# Patient Record
Sex: MALE | Race: WHITE | NOT HISPANIC OR LATINO | Employment: FULL TIME | URBAN - METROPOLITAN AREA
[De-identification: names, ages, dates, MRNs, and addresses within clinical notes are randomized per-mention and may not be internally consistent; named-entity substitution may affect disease eponyms.]

---

## 2023-05-23 ENCOUNTER — OFFICE VISIT (OUTPATIENT)
Dept: PRIMARY CARE | Facility: CLINIC | Age: 28
End: 2023-05-23
Payer: COMMERCIAL

## 2023-05-23 ENCOUNTER — LAB (OUTPATIENT)
Dept: LAB | Facility: LAB | Age: 28
End: 2023-05-23
Payer: COMMERCIAL

## 2023-05-23 VITALS
HEIGHT: 72 IN | BODY MASS INDEX: 27.43 KG/M2 | SYSTOLIC BLOOD PRESSURE: 115 MMHG | OXYGEN SATURATION: 97 % | HEART RATE: 90 BPM | WEIGHT: 202.5 LBS | DIASTOLIC BLOOD PRESSURE: 77 MMHG

## 2023-05-23 DIAGNOSIS — M77.11 LATERAL EPICONDYLITIS OF RIGHT ELBOW: Primary | ICD-10-CM

## 2023-05-23 DIAGNOSIS — N52.9 ERECTILE DYSFUNCTION, UNSPECIFIED ERECTILE DYSFUNCTION TYPE: ICD-10-CM

## 2023-05-23 PROBLEM — E78.2 MIXED DYSLIPIDEMIA: Status: ACTIVE | Noted: 2023-05-23

## 2023-05-23 PROBLEM — Q55.29 APPENDIX TESTES: Status: ACTIVE | Noted: 2023-05-23

## 2023-05-23 PROBLEM — R20.2 PARESTHESIA OF UPPER EXTREMITY: Status: ACTIVE | Noted: 2023-05-23

## 2023-05-23 PROBLEM — H69.93 DYSFUNCTION OF BOTH EUSTACHIAN TUBES: Status: ACTIVE | Noted: 2023-05-23

## 2023-05-23 PROBLEM — I10 BENIGN LABILE HYPERTENSION: Status: ACTIVE | Noted: 2023-05-23

## 2023-05-23 PROBLEM — R16.1 SPLENOMEGALY: Status: ACTIVE | Noted: 2023-05-23

## 2023-05-23 PROBLEM — U07.1 COVID-19: Status: ACTIVE | Noted: 2023-05-23

## 2023-05-23 PROBLEM — M26.629 TEMPOROMANDIBULAR JOINT PAIN DYSFUNCTION SYNDROME: Status: ACTIVE | Noted: 2023-05-23

## 2023-05-23 PROBLEM — K21.9 GERD (GASTROESOPHAGEAL REFLUX DISEASE): Status: ACTIVE | Noted: 2023-05-23

## 2023-05-23 PROCEDURE — 99213 OFFICE O/P EST LOW 20 MIN: CPT | Performed by: NURSE PRACTITIONER

## 2023-05-23 PROCEDURE — 1036F TOBACCO NON-USER: CPT | Performed by: NURSE PRACTITIONER

## 2023-05-23 PROCEDURE — 3078F DIAST BP <80 MM HG: CPT | Performed by: NURSE PRACTITIONER

## 2023-05-23 PROCEDURE — 83036 HEMOGLOBIN GLYCOSYLATED A1C: CPT

## 2023-05-23 PROCEDURE — 80061 LIPID PANEL: CPT

## 2023-05-23 PROCEDURE — 84403 ASSAY OF TOTAL TESTOSTERONE: CPT

## 2023-05-23 PROCEDURE — 85025 COMPLETE CBC W/AUTO DIFF WBC: CPT

## 2023-05-23 PROCEDURE — 36415 COLL VENOUS BLD VENIPUNCTURE: CPT

## 2023-05-23 PROCEDURE — 3074F SYST BP LT 130 MM HG: CPT | Performed by: NURSE PRACTITIONER

## 2023-05-23 PROCEDURE — 84443 ASSAY THYROID STIM HORMONE: CPT

## 2023-05-23 PROCEDURE — 80053 COMPREHEN METABOLIC PANEL: CPT

## 2023-05-23 RX ORDER — METHYLPREDNISOLONE 4 MG/1
TABLET ORAL
Qty: 21 TABLET | Refills: 0 | Status: SHIPPED | OUTPATIENT
Start: 2023-05-23 | End: 2023-05-30

## 2023-05-23 ASSESSMENT — ENCOUNTER SYMPTOMS
VOMITING: 0
CHILLS: 0
COUGH: 0
WHEEZING: 0
PALPITATIONS: 0
NAUSEA: 0
SORE THROAT: 0
ABDOMINAL PAIN: 0
SHORTNESS OF BREATH: 0
DIARRHEA: 0
FEVER: 0

## 2023-05-23 NOTE — PATIENT INSTRUCTIONS
Obtain blood work as ordered -- we will call you with the results  Medrol dose pack sent to pharmacy  Make sure to drink plenty of fluids  Follow up for complete physical exam

## 2023-05-23 NOTE — PROGRESS NOTES
Subjective   Chief Complaint: Tennis elbow (Right arm ).    HPI   Tien Salcedo is a 28 y.o. male who presents for Tennis elbow (Right arm ).    Patient presents with right elbow pain that started last week. He reports having the same pain in the past. This started 1 week ago when he started lifting weights in the gym. Had received steroid injections in the past but is not interested in doing any more injections.     He reports a sharp pain when lifting weights.     Patient also reports issues with erectile dysfunction over the past 6 months. He reports ability to obtain erection but unable to sustain.   He denies pain. He reports doing a fasting diet and sometimes goes 24 hours without eating. He has been doing this diet for 1 year.   We discussed the potential causes of his ED. Encouraged patient to avoid fasting diets. And will have blood work done. Recommend urology referral patient refuses at this time.     Review of Systems   Constitutional:  Negative for chills and fever.   HENT:  Negative for congestion and sore throat.    Respiratory:  Negative for cough, shortness of breath and wheezing.    Cardiovascular:  Negative for chest pain and palpitations.   Gastrointestinal:  Negative for abdominal pain, diarrhea, nausea and vomiting.   Genitourinary:  Negative for penile discharge, penile pain, penile swelling, scrotal swelling and testicular pain.   Musculoskeletal:         Right elbow pain        Objective   /77   Pulse 90   Ht 1.829 m (6')   Wt 91.9 kg (202 lb 8 oz)   SpO2 97%   BMI 27.46 kg/m²   BSA Body surface area is 2.16 meters squared.      Physical Exam  Constitutional:       Appearance: Normal appearance.   Cardiovascular:      Rate and Rhythm: Normal rate and regular rhythm.   Pulmonary:      Effort: Pulmonary effort is normal.      Breath sounds: Normal breath sounds.   Abdominal:      General: Abdomen is flat.      Palpations: Abdomen is soft.   Musculoskeletal:      Comments: ESTEPHANIA  elbow lateral epicondyle tender  Full ROM    Neurological:      Mental Status: He is alert.       [facility] Encounter on 10/11/2022   Component Date Value Ref Range Status    Glucose 10/11/2022 90  74 - 99 mg/dL Final    Sodium 10/11/2022 139  136 - 145 mmol/L Final    Potassium 10/11/2022 4.2  3.5 - 5.3 mmol/L Final    Chloride 10/11/2022 99  98 - 107 mmol/L Final    Bicarbonate 10/11/2022 32  21 - 32 mmol/L Final    Anion Gap 10/11/2022 12  10 - 20 mmol/L Final    Urea Nitrogen 10/11/2022 26 (H)  6 - 23 mg/dL Final    Creatinine 10/11/2022 1.32 (H)  0.50 - 1.30 mg/dL Final    GFR MALE 10/11/2022 75  >90 mL/min/1.73m2 Final    Comment:  CALCULATIONS OF ESTIMATED GFR ARE PERFORMED   USING THE 2021 CKD-EPI STUDY REFIT EQUATION   WITHOUT THE RACE VARIABLE FOR THE IDMS-TRACEABLE   CREATININE METHODS.    https://jasn.asnjournals.org/content/early/2021/09/22/ASN.7006680010      Calcium 10/11/2022 9.7  8.6 - 10.3 mg/dL Final    Albumin 10/11/2022 4.7  3.4 - 5.0 g/dL Final    Alkaline Phosphatase 10/11/2022 57  33 - 120 U/L Final    Total Protein 10/11/2022 7.4  6.4 - 8.2 g/dL Final    AST 10/11/2022 17  9 - 39 U/L Final    Total Bilirubin 10/11/2022 1.0  0.0 - 1.2 mg/dL Final    ALT (SGPT) 10/11/2022 30  10 - 52 U/L Final    Comment:  Patients treated with Sulfasalazine may generate    falsely decreased results for ALT.       No current outpatient medications on file prior to visit.     No current facility-administered medications on file prior to visit.     No images are attached to the encounter.            Assessment/Plan   Problem List Items Addressed This Visit          Genitourinary    Erectile dysfunction     BW ordered   Recommend urology referral -- if BW is normal          Relevant Orders    CBC and Auto Differential    Comprehensive Metabolic Panel    TSH with reflex to Free T4 if abnormal    Lipid Panel    Testosterone       Musculoskeletal    Lateral epicondylitis of right elbow - Primary     Methylprednisolone  ordered           Relevant Medications    methylPREDNISolone (Medrol Dospak) 4 mg tablets

## 2023-05-24 LAB
ALANINE AMINOTRANSFERASE (SGPT) (U/L) IN SER/PLAS: 24 U/L (ref 10–52)
ALBUMIN (G/DL) IN SER/PLAS: 4.8 G/DL (ref 3.4–5)
ALKALINE PHOSPHATASE (U/L) IN SER/PLAS: 55 U/L (ref 33–120)
ANION GAP IN SER/PLAS: 14 MMOL/L (ref 10–20)
ASPARTATE AMINOTRANSFERASE (SGOT) (U/L) IN SER/PLAS: 19 U/L (ref 9–39)
BASOPHILS (10*3/UL) IN BLOOD BY AUTOMATED COUNT: 0.07 X10E9/L (ref 0–0.1)
BASOPHILS/100 LEUKOCYTES IN BLOOD BY AUTOMATED COUNT: 1 % (ref 0–2)
BILIRUBIN TOTAL (MG/DL) IN SER/PLAS: 0.6 MG/DL (ref 0–1.2)
CALCIUM (MG/DL) IN SER/PLAS: 10.7 MG/DL (ref 8.6–10.6)
CARBON DIOXIDE, TOTAL (MMOL/L) IN SER/PLAS: 32 MMOL/L (ref 21–32)
CHLORIDE (MMOL/L) IN SER/PLAS: 100 MMOL/L (ref 98–107)
CHOLESTEROL (MG/DL) IN SER/PLAS: 173 MG/DL (ref 0–199)
CHOLESTEROL IN HDL (MG/DL) IN SER/PLAS: 40.5 MG/DL
CHOLESTEROL/HDL RATIO: 4.3
CREATININE (MG/DL) IN SER/PLAS: 1.2 MG/DL (ref 0.5–1.3)
EOSINOPHILS (10*3/UL) IN BLOOD BY AUTOMATED COUNT: 0.1 X10E9/L (ref 0–0.7)
EOSINOPHILS/100 LEUKOCYTES IN BLOOD BY AUTOMATED COUNT: 1.5 % (ref 0–6)
ERYTHROCYTE DISTRIBUTION WIDTH (RATIO) BY AUTOMATED COUNT: 12.8 % (ref 11.5–14.5)
ERYTHROCYTE MEAN CORPUSCULAR HEMOGLOBIN CONCENTRATION (G/DL) BY AUTOMATED: 32.4 G/DL (ref 32–36)
ERYTHROCYTE MEAN CORPUSCULAR VOLUME (FL) BY AUTOMATED COUNT: 94 FL (ref 80–100)
ERYTHROCYTES (10*6/UL) IN BLOOD BY AUTOMATED COUNT: 5.87 X10E12/L (ref 4.5–5.9)
ESTIMATED AVERAGE GLUCOSE FOR HBA1C: 103 MG/DL
GFR MALE: 83 ML/MIN/1.73M2
GLUCOSE (MG/DL) IN SER/PLAS: 101 MG/DL (ref 74–99)
HEMATOCRIT (%) IN BLOOD BY AUTOMATED COUNT: 54.9 % (ref 41–52)
HEMOGLOBIN (G/DL) IN BLOOD: 17.8 G/DL (ref 13.5–17.5)
HEMOGLOBIN A1C/HEMOGLOBIN TOTAL IN BLOOD: 5.2 %
IMMATURE GRANULOCYTES/100 LEUKOCYTES IN BLOOD BY AUTOMATED COUNT: 0.1 % (ref 0–0.9)
LDL: 116 MG/DL (ref 0–99)
LEUKOCYTES (10*3/UL) IN BLOOD BY AUTOMATED COUNT: 6.9 X10E9/L (ref 4.4–11.3)
LYMPHOCYTES (10*3/UL) IN BLOOD BY AUTOMATED COUNT: 1.5 X10E9/L (ref 1.2–4.8)
LYMPHOCYTES/100 LEUKOCYTES IN BLOOD BY AUTOMATED COUNT: 21.8 % (ref 13–44)
MONOCYTES (10*3/UL) IN BLOOD BY AUTOMATED COUNT: 0.67 X10E9/L (ref 0.1–1)
MONOCYTES/100 LEUKOCYTES IN BLOOD BY AUTOMATED COUNT: 9.7 % (ref 2–10)
NEUTROPHILS (10*3/UL) IN BLOOD BY AUTOMATED COUNT: 4.54 X10E9/L (ref 1.2–7.7)
NEUTROPHILS/100 LEUKOCYTES IN BLOOD BY AUTOMATED COUNT: 65.9 % (ref 40–80)
NRBC (PER 100 WBCS) BY AUTOMATED COUNT: 0 /100 WBC (ref 0–0)
PLATELETS (10*3/UL) IN BLOOD AUTOMATED COUNT: 174 X10E9/L (ref 150–450)
POTASSIUM (MMOL/L) IN SER/PLAS: 4.8 MMOL/L (ref 3.5–5.3)
PROTEIN TOTAL: 7.8 G/DL (ref 6.4–8.2)
SODIUM (MMOL/L) IN SER/PLAS: 141 MMOL/L (ref 136–145)
TESTOSTERONE (NG/DL) IN SER/PLAS: 425 NG/DL (ref 240–1000)
THYROTROPIN (MIU/L) IN SER/PLAS BY DETECTION LIMIT <= 0.05 MIU/L: 1.24 MIU/L (ref 0.44–3.98)
TRIGLYCERIDE (MG/DL) IN SER/PLAS: 85 MG/DL (ref 0–149)
UREA NITROGEN (MG/DL) IN SER/PLAS: 18 MG/DL (ref 6–23)
VLDL: 17 MG/DL (ref 0–40)

## 2023-05-24 RX ORDER — SILDENAFIL 50 MG/1
50 TABLET, FILM COATED ORAL DAILY PRN
Qty: 12 TABLET | Refills: 0 | Status: SHIPPED | OUTPATIENT
Start: 2023-05-24

## 2023-06-06 ENCOUNTER — LAB (OUTPATIENT)
Dept: LAB | Facility: LAB | Age: 28
End: 2023-06-06
Payer: COMMERCIAL

## 2023-06-06 ENCOUNTER — OFFICE VISIT (OUTPATIENT)
Dept: PRIMARY CARE | Facility: CLINIC | Age: 28
End: 2023-06-06
Payer: COMMERCIAL

## 2023-06-06 VITALS
TEMPERATURE: 97.5 F | HEART RATE: 108 BPM | SYSTOLIC BLOOD PRESSURE: 128 MMHG | HEIGHT: 72 IN | WEIGHT: 204 LBS | OXYGEN SATURATION: 97 % | BODY MASS INDEX: 27.63 KG/M2 | DIASTOLIC BLOOD PRESSURE: 84 MMHG

## 2023-06-06 DIAGNOSIS — R53.83 OTHER FATIGUE: ICD-10-CM

## 2023-06-06 DIAGNOSIS — J02.9 PHARYNGITIS, UNSPECIFIED ETIOLOGY: ICD-10-CM

## 2023-06-06 DIAGNOSIS — J02.9 PHARYNGITIS, UNSPECIFIED ETIOLOGY: Primary | ICD-10-CM

## 2023-06-06 LAB — POC RAPID STREP: NEGATIVE

## 2023-06-06 PROCEDURE — 86308 HETEROPHILE ANTIBODY SCREEN: CPT

## 2023-06-06 PROCEDURE — 99213 OFFICE O/P EST LOW 20 MIN: CPT | Performed by: STUDENT IN AN ORGANIZED HEALTH CARE EDUCATION/TRAINING PROGRAM

## 2023-06-06 PROCEDURE — 3079F DIAST BP 80-89 MM HG: CPT | Performed by: STUDENT IN AN ORGANIZED HEALTH CARE EDUCATION/TRAINING PROGRAM

## 2023-06-06 PROCEDURE — 87880 STREP A ASSAY W/OPTIC: CPT | Performed by: STUDENT IN AN ORGANIZED HEALTH CARE EDUCATION/TRAINING PROGRAM

## 2023-06-06 PROCEDURE — 1036F TOBACCO NON-USER: CPT | Performed by: STUDENT IN AN ORGANIZED HEALTH CARE EDUCATION/TRAINING PROGRAM

## 2023-06-06 PROCEDURE — 87081 CULTURE SCREEN ONLY: CPT

## 2023-06-06 PROCEDURE — 36415 COLL VENOUS BLD VENIPUNCTURE: CPT

## 2023-06-06 PROCEDURE — 3074F SYST BP LT 130 MM HG: CPT | Performed by: STUDENT IN AN ORGANIZED HEALTH CARE EDUCATION/TRAINING PROGRAM

## 2023-06-06 RX ORDER — AMOXICILLIN AND CLAVULANATE POTASSIUM 875; 125 MG/1; MG/1
875 TABLET, FILM COATED ORAL 2 TIMES DAILY
Qty: 20 TABLET | Refills: 0 | Status: SHIPPED | OUTPATIENT
Start: 2023-06-06 | End: 2023-06-16

## 2023-06-06 ASSESSMENT — ENCOUNTER SYMPTOMS
SORE THROAT: 1
RHINORRHEA: 0
CHILLS: 0
COUGH: 0
VOMITING: 0
ABDOMINAL PAIN: 0
DYSURIA: 0
SINUS PAIN: 0
PALPITATIONS: 0
SINUS PRESSURE: 0
SHORTNESS OF BREATH: 0
NAUSEA: 0
HEADACHES: 0
LIGHT-HEADEDNESS: 0
MYALGIAS: 0
CONSTIPATION: 0
DIARRHEA: 0
FATIGUE: 1
ARTHRALGIAS: 0
FREQUENCY: 0
DIZZINESS: 0
FEVER: 0

## 2023-06-06 NOTE — PROGRESS NOTES
Subjective   Patient ID: Tien Salcedo is a 28 y.o. male who presents for scratchy throat (White patches on tonsils X 2 days ).    HPI     4-5 days of sore throat.  He has been more tired in general in the evenings.  He had some white patches last night but he did not last night.  Throat was scratchy.  He does have a new girlfriend and sexual partner.    Review of Systems   Constitutional:  Positive for fatigue. Negative for chills and fever.   HENT:  Positive for sore throat. Negative for congestion, postnasal drip, rhinorrhea, sinus pressure and sinus pain.    Respiratory:  Negative for cough and shortness of breath.    Cardiovascular:  Negative for chest pain and palpitations.   Gastrointestinal:  Negative for abdominal pain, constipation, diarrhea, nausea and vomiting.   Genitourinary:  Negative for dysuria, frequency, penile pain and penile swelling.   Musculoskeletal:  Negative for arthralgias and myalgias.   Neurological:  Negative for dizziness, light-headedness and headaches.       Objective   /84   Pulse 108   Temp 36.4 °C (97.5 °F)   Ht 1.829 m (6')   Wt 92.5 kg (204 lb)   SpO2 97%   BMI 27.67 kg/m²     Physical Exam  Vitals and nursing note reviewed.   Constitutional:       General: He is not in acute distress.     Appearance: Normal appearance. He is normal weight. He is not ill-appearing or toxic-appearing.   Cardiovascular:      Rate and Rhythm: Normal rate and regular rhythm.      Heart sounds: Normal heart sounds.   Pulmonary:      Effort: Pulmonary effort is normal.      Breath sounds: Normal breath sounds.   Abdominal:      General: Abdomen is flat. Bowel sounds are normal.      Palpations: Abdomen is soft.   Neurological:      Mental Status: He is alert.         Assessment/Plan   Problem List Items Addressed This Visit    None  Visit Diagnoses       Pharyngitis, unspecified etiology    -  Primary    Relevant Medications    amoxicillin-pot clavulanate (Augmentin) 875-125 mg tablet     Other Relevant Orders    Mononucleosis Screen    Other fatigue

## 2023-06-07 LAB — MONONUCLEOSIS SCREEN: NEGATIVE

## 2023-06-09 LAB — GROUP A STREP SCREEN, CULTURE: NORMAL

## 2023-06-14 ENCOUNTER — TELEPHONE (OUTPATIENT)
Dept: PRIMARY CARE | Facility: CLINIC | Age: 28
End: 2023-06-14
Payer: COMMERCIAL

## 2023-06-14 NOTE — TELEPHONE ENCOUNTER
Pt was seen by Dr. Godfrey for a sore throat and swollen tonsils, strep and mono test were both negative; Dr. Godfrey prescribed an antibiotic (amoxicillin) just in case; pt would like to know if he should start the medication since he is still having symptoms    Please advise [phone]

## 2023-07-06 ENCOUNTER — OFFICE VISIT (OUTPATIENT)
Dept: PRIMARY CARE | Facility: CLINIC | Age: 28
End: 2023-07-06
Payer: COMMERCIAL

## 2023-07-06 VITALS
OXYGEN SATURATION: 95 % | HEIGHT: 72 IN | BODY MASS INDEX: 27.9 KG/M2 | WEIGHT: 206 LBS | SYSTOLIC BLOOD PRESSURE: 115 MMHG | TEMPERATURE: 97.8 F | DIASTOLIC BLOOD PRESSURE: 75 MMHG | HEART RATE: 87 BPM

## 2023-07-06 DIAGNOSIS — B35.3 TINEA PEDIS OF BOTH FEET: Primary | ICD-10-CM

## 2023-07-06 DIAGNOSIS — L50.9 WHEAL: ICD-10-CM

## 2023-07-06 PROCEDURE — 3074F SYST BP LT 130 MM HG: CPT | Performed by: STUDENT IN AN ORGANIZED HEALTH CARE EDUCATION/TRAINING PROGRAM

## 2023-07-06 PROCEDURE — 99213 OFFICE O/P EST LOW 20 MIN: CPT | Performed by: STUDENT IN AN ORGANIZED HEALTH CARE EDUCATION/TRAINING PROGRAM

## 2023-07-06 PROCEDURE — 3078F DIAST BP <80 MM HG: CPT | Performed by: STUDENT IN AN ORGANIZED HEALTH CARE EDUCATION/TRAINING PROGRAM

## 2023-07-06 PROCEDURE — 1036F TOBACCO NON-USER: CPT | Performed by: STUDENT IN AN ORGANIZED HEALTH CARE EDUCATION/TRAINING PROGRAM

## 2023-07-06 RX ORDER — AZITHROMYCIN 500 MG/1
TABLET, FILM COATED ORAL
COMMUNITY
Start: 2023-06-28 | End: 2023-07-06 | Stop reason: ALTCHOICE

## 2023-07-06 RX ORDER — TOLNAFTATE 1 G/100G
POWDER TOPICAL 2 TIMES DAILY
Qty: 108 G | Refills: 0 | Status: SHIPPED | OUTPATIENT
Start: 2023-07-06

## 2023-07-06 ASSESSMENT — ENCOUNTER SYMPTOMS
ABDOMINAL PAIN: 0
NAUSEA: 0
RHINORRHEA: 0
FATIGUE: 0
FEVER: 0
DIARRHEA: 0
VOMITING: 0
SHORTNESS OF BREATH: 0
CONSTIPATION: 0
CHILLS: 0

## 2023-07-06 NOTE — PROGRESS NOTES
Subjective   Patient ID: Tien Salcedo is a 28 y.o. male who presents for Mass (Lump behind right ear X 1 week).    HPI     He has noticed a lump behind the right ear.  Noticed it for the past week.  It has seemed to be about the same size. He has not noticed any other skin changes.    Patient also has bilateral athlete's foot.  He had been kayaking and thinks that is probably what precipitated at this time.  He has had the athlete's foot in the past and thinks that he is just more overly sensitive to it.  He has not tried anything on it at least at this time.  He has used tea tree oil in the past.    Review of Systems   Constitutional:  Negative for chills, fatigue and fever.   HENT:  Negative for congestion and rhinorrhea.    Respiratory:  Negative for shortness of breath.    Cardiovascular:  Negative for chest pain.   Gastrointestinal:  Negative for abdominal pain, constipation, diarrhea, nausea and vomiting.       Objective   /75   Pulse 87   Temp 36.6 °C (97.8 °F)   Ht 1.829 m (6')   Wt 93.4 kg (206 lb)   SpO2 95%   BMI 27.94 kg/m²     Physical Exam  Vitals and nursing note reviewed.   Constitutional:       General: He is not in acute distress.     Appearance: Normal appearance. He is normal weight. He is not ill-appearing or toxic-appearing.   Cardiovascular:      Rate and Rhythm: Normal rate and regular rhythm.      Heart sounds: Normal heart sounds.   Pulmonary:      Effort: Pulmonary effort is normal.      Breath sounds: Normal breath sounds.   Skin:     General: Skin is warm and dry.      Comments: Small wheal behind right ear   Neurological:      Mental Status: He is alert.         Assessment/Plan   Problem List Items Addressed This Visit    None  Visit Diagnoses       Tinea pedis of both feet    -  Primary    Relevant Medications    tolnaftate (Tinactin) 1 % powder    Wheal              History and physical examination as above.  Prescribed tolnaftate powder for bilateral athlete's foot.   Discussed other conservative measures to help with treatment and keeping feet dry and clean.  Small wheal present behind the right ear.  No overlying skin changes or other symptoms.  No concerning findings.  No present lymph nodes.  No other symptoms.  Discussed with patient to keep an eye on the area and if it starts changing we can take another look in the office.  He is understanding and in agreement with this plan.

## 2023-08-01 ENCOUNTER — OFFICE VISIT (OUTPATIENT)
Dept: PRIMARY CARE | Facility: CLINIC | Age: 28
End: 2023-08-01
Payer: COMMERCIAL

## 2023-08-01 VITALS
DIASTOLIC BLOOD PRESSURE: 87 MMHG | TEMPERATURE: 97.8 F | SYSTOLIC BLOOD PRESSURE: 120 MMHG | OXYGEN SATURATION: 94 % | BODY MASS INDEX: 28.04 KG/M2 | HEART RATE: 102 BPM | HEIGHT: 72 IN | WEIGHT: 207 LBS

## 2023-08-01 DIAGNOSIS — S16.1XXA STRAIN OF NECK MUSCLE, INITIAL ENCOUNTER: ICD-10-CM

## 2023-08-01 DIAGNOSIS — M79.10 MUSCLE SORENESS: ICD-10-CM

## 2023-08-01 DIAGNOSIS — M62.838 MUSCLE SPASM: ICD-10-CM

## 2023-08-01 DIAGNOSIS — M53.3 ACUTE COCCYGEAL PAIN: ICD-10-CM

## 2023-08-01 DIAGNOSIS — M25.572 ACUTE LEFT ANKLE PAIN: ICD-10-CM

## 2023-08-01 DIAGNOSIS — S39.012A STRAIN OF LUMBAR REGION, INITIAL ENCOUNTER: ICD-10-CM
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
## 2023-08-01 DIAGNOSIS — M54.50 ACUTE LEFT-SIDED LOW BACK PAIN WITHOUT SCIATICA: ICD-10-CM

## 2023-08-01 DIAGNOSIS — S29.019A THORACIC MYOFASCIAL STRAIN, INITIAL ENCOUNTER: ICD-10-CM

## 2023-08-01 PROCEDURE — 3074F SYST BP LT 130 MM HG: CPT | Performed by: STUDENT IN AN ORGANIZED HEALTH CARE EDUCATION/TRAINING PROGRAM

## 2023-08-01 PROCEDURE — 1036F TOBACCO NON-USER: CPT | Performed by: STUDENT IN AN ORGANIZED HEALTH CARE EDUCATION/TRAINING PROGRAM

## 2023-08-01 PROCEDURE — 3079F DIAST BP 80-89 MM HG: CPT | Performed by: STUDENT IN AN ORGANIZED HEALTH CARE EDUCATION/TRAINING PROGRAM

## 2023-08-01 PROCEDURE — 99214 OFFICE O/P EST MOD 30 MIN: CPT | Performed by: STUDENT IN AN ORGANIZED HEALTH CARE EDUCATION/TRAINING PROGRAM

## 2023-08-01 RX ORDER — CYCLOBENZAPRINE HCL 5 MG
5 TABLET ORAL NIGHTLY PRN
Qty: 14 TABLET | Refills: 0 | Status: SHIPPED | OUTPATIENT
Start: 2023-08-01 | End: 2023-09-30

## 2023-08-01 RX ORDER — METHYLPREDNISOLONE 4 MG/1
TABLET ORAL
Qty: 21 TABLET | Refills: 0 | Status: SHIPPED | OUTPATIENT
Start: 2023-08-01 | End: 2023-08-08

## 2023-08-01 ASSESSMENT — ENCOUNTER SYMPTOMS
HEADACHES: 1
FREQUENCY: 0
MYALGIAS: 1
ABDOMINAL PAIN: 0
FEVER: 0
PHOTOPHOBIA: 1
FLANK PAIN: 0
LIGHT-HEADEDNESS: 0
COUGH: 0
FATIGUE: 0
DIZZINESS: 0
SHORTNESS OF BREATH: 0
ARTHRALGIAS: 0
CHILLS: 0
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
## 2023-08-01 NOTE — LETTER
August 1, 2023     Patient: Tien Salcedo   YOB: 1995   Date of Visit: 8/1/2023       To Whom It May Concern:    Tien Salcedo was seen in my clinic on 8/1/2023 at 10:40 am. Please excuse Tien for his absence from work on this day to make the appointment.    He may return to work on 8/3/23.    If you have any questions or concerns, please don't hesitate to call.         Sincerely,         Noé Godfrey,         CC: No Recipients

## 2023-08-04 ENCOUNTER — OFFICE VISIT (OUTPATIENT)
Dept: PRIMARY CARE | Facility: CLINIC | Age: 28
End: 2023-08-04
Payer: COMMERCIAL

## 2023-08-04 VITALS
SYSTOLIC BLOOD PRESSURE: 111 MMHG | HEART RATE: 92 BPM | DIASTOLIC BLOOD PRESSURE: 75 MMHG | HEIGHT: 72 IN | OXYGEN SATURATION: 95 % | BODY MASS INDEX: 28.17 KG/M2 | WEIGHT: 208 LBS

## 2023-08-04 DIAGNOSIS — M99.01 SOMATIC DYSFUNCTION OF CERVICAL REGION: ICD-10-CM

## 2023-08-04 DIAGNOSIS — M79.10 MUSCLE SORENESS: ICD-10-CM

## 2023-08-04 DIAGNOSIS — M99.03 SOMATIC DYSFUNCTION OF LUMBAR REGION: ICD-10-CM

## 2023-08-04 DIAGNOSIS — M62.838 MUSCLE SPASM: ICD-10-CM

## 2023-08-04 DIAGNOSIS — M99.04 SOMATIC DYSFUNCTION OF SACRAL REGION: ICD-10-CM

## 2023-08-04 DIAGNOSIS — M99.05 SOMATIC DYSFUNCTION OF PELVIS REGION: ICD-10-CM

## 2023-08-04 DIAGNOSIS — S29.019A THORACIC MYOFASCIAL STRAIN, INITIAL ENCOUNTER: ICD-10-CM

## 2023-08-04 DIAGNOSIS — M99.00 SOMATIC DYSFUNCTION OF HEAD REGION: ICD-10-CM

## 2023-08-04 DIAGNOSIS — S16.1XXA STRAIN OF NECK MUSCLE, INITIAL ENCOUNTER: ICD-10-CM

## 2023-08-04 DIAGNOSIS — M54.50 ACUTE LEFT-SIDED LOW BACK PAIN WITHOUT SCIATICA: ICD-10-CM

## 2023-08-04 DIAGNOSIS — S39.012A STRAIN OF LUMBAR REGION, INITIAL ENCOUNTER: ICD-10-CM
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
## 2023-08-04 DIAGNOSIS — M99.02 SOMATIC DYSFUNCTION OF THORACIC REGION: ICD-10-CM

## 2023-08-04 PROCEDURE — 3074F SYST BP LT 130 MM HG: CPT | Performed by: STUDENT IN AN ORGANIZED HEALTH CARE EDUCATION/TRAINING PROGRAM

## 2023-08-04 PROCEDURE — 1036F TOBACCO NON-USER: CPT | Performed by: STUDENT IN AN ORGANIZED HEALTH CARE EDUCATION/TRAINING PROGRAM

## 2023-08-04 PROCEDURE — 3078F DIAST BP <80 MM HG: CPT | Performed by: STUDENT IN AN ORGANIZED HEALTH CARE EDUCATION/TRAINING PROGRAM

## 2023-08-04 PROCEDURE — 99213 OFFICE O/P EST LOW 20 MIN: CPT | Performed by: STUDENT IN AN ORGANIZED HEALTH CARE EDUCATION/TRAINING PROGRAM

## 2023-08-04 PROCEDURE — 98927 OSTEOPATH MANJ 5-6 REGIONS: CPT | Performed by: STUDENT IN AN ORGANIZED HEALTH CARE EDUCATION/TRAINING PROGRAM

## 2023-08-04 ASSESSMENT — ENCOUNTER SYMPTOMS
HEADACHES: 1
ABDOMINAL PAIN: 0
FEVER: 0
CHILLS: 0
FATIGUE: 0
ARTHRALGIAS: 0
BACK PAIN: 1
SHORTNESS OF BREATH: 0
MYALGIAS: 1
DIZZINESS: 0
LIGHT-HEADEDNESS: 0
FREQUENCY: 0
PHOTOPHOBIA: 0
FLANK PAIN: 0
COUGH: 0
NECK PAIN: 1

## 2023-08-04 NOTE — PATIENT INSTRUCTIONS
You can take ibuprofen and Tylenol for muscle soreness.  Please continue with heat and stretching and other activities.  Please finish off the steroid taper and you can continue using the muscle relaxer.  See how your symptoms do over the weekend.  If you feel that this treatment helped a benefit, you can schedule another appointment with me next week if you would like or the week after.  We can always do a referral to physical therapy if you need to.  Please let me know.  Thank you
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
## 2023-08-23 ENCOUNTER — TELEPHONE (OUTPATIENT)
Dept: PRIMARY CARE | Facility: CLINIC | Age: 28
End: 2023-08-23
Payer: COMMERCIAL

## 2023-08-23 DIAGNOSIS — M62.838 MUSCLE SPASM: ICD-10-CM

## 2023-08-23 DIAGNOSIS — S16.1XXA STRAIN OF NECK MUSCLE, INITIAL ENCOUNTER: Primary | ICD-10-CM
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
## 2023-08-23 DIAGNOSIS — S29.019A THORACIC MYOFASCIAL STRAIN, INITIAL ENCOUNTER: ICD-10-CM

## 2023-08-23 DIAGNOSIS — S39.012A STRAIN OF LUMBAR REGION, INITIAL ENCOUNTER: ICD-10-CM

## 2023-08-23 DIAGNOSIS — M79.10 MUSCLE SORENESS: ICD-10-CM

## 2023-08-23 NOTE — TELEPHONE ENCOUNTER
Pt needs a referral for physical therapy sent to [facility]   Fax : [phone]  Ph. [phone]

## 2023-09-11 ENCOUNTER — OFFICE VISIT (OUTPATIENT)
Dept: PRIMARY CARE | Facility: CLINIC | Age: 28
End: 2023-09-11
Payer: COMMERCIAL

## 2023-09-11 VITALS
BODY MASS INDEX: 28.44 KG/M2 | HEIGHT: 72 IN | OXYGEN SATURATION: 97 % | WEIGHT: 210 LBS | SYSTOLIC BLOOD PRESSURE: 111 MMHG | DIASTOLIC BLOOD PRESSURE: 78 MMHG | HEART RATE: 84 BPM | TEMPERATURE: 97.3 F

## 2023-09-11 DIAGNOSIS — R05.1 ACUTE COUGH: Primary | ICD-10-CM

## 2023-09-11 DIAGNOSIS — K21.9 GASTROESOPHAGEAL REFLUX DISEASE, UNSPECIFIED WHETHER ESOPHAGITIS PRESENT: ICD-10-CM

## 2023-09-11 PROCEDURE — 99213 OFFICE O/P EST LOW 20 MIN: CPT | Performed by: STUDENT IN AN ORGANIZED HEALTH CARE EDUCATION/TRAINING PROGRAM

## 2023-09-11 PROCEDURE — 1036F TOBACCO NON-USER: CPT | Performed by: STUDENT IN AN ORGANIZED HEALTH CARE EDUCATION/TRAINING PROGRAM

## 2023-09-11 PROCEDURE — 3074F SYST BP LT 130 MM HG: CPT | Performed by: STUDENT IN AN ORGANIZED HEALTH CARE EDUCATION/TRAINING PROGRAM

## 2023-09-11 PROCEDURE — 3078F DIAST BP <80 MM HG: CPT | Performed by: STUDENT IN AN ORGANIZED HEALTH CARE EDUCATION/TRAINING PROGRAM

## 2023-09-11 PROCEDURE — 87635 SARS-COV-2 COVID-19 AMP PRB: CPT

## 2023-09-11 RX ORDER — FLUTICASONE PROPIONATE 50 MCG
1 SPRAY, SUSPENSION (ML) NASAL DAILY
Qty: 16 G | Refills: 1 | Status: SHIPPED | OUTPATIENT
Start: 2023-09-11 | End: 2024-03-27 | Stop reason: SDUPTHER

## 2023-09-11 RX ORDER — FAMOTIDINE 20 MG/1
20 TABLET, FILM COATED ORAL 2 TIMES DAILY
Qty: 180 TABLET | Refills: 0 | Status: SHIPPED | OUTPATIENT
Start: 2023-09-11 | End: 2023-12-11

## 2023-09-11 RX ORDER — CETIRIZINE HYDROCHLORIDE 10 MG/1
10 TABLET ORAL DAILY
Qty: 90 TABLET | Refills: 0 | Status: SHIPPED | OUTPATIENT
Start: 2023-09-11 | End: 2024-03-27 | Stop reason: SDUPTHER

## 2023-09-11 RX ORDER — PANTOPRAZOLE SODIUM 20 MG/1
20 TABLET, DELAYED RELEASE ORAL
Qty: 30 TABLET | Refills: 0 | Status: SHIPPED | OUTPATIENT
Start: 2023-09-11 | End: 2023-10-11

## 2023-09-11 RX ORDER — BENZONATATE 100 MG/1
100 CAPSULE ORAL 3 TIMES DAILY PRN
Qty: 42 CAPSULE | Refills: 0 | Status: SHIPPED | OUTPATIENT
Start: 2023-09-11 | End: 2023-10-02 | Stop reason: ALTCHOICE

## 2023-09-11 ASSESSMENT — ENCOUNTER SYMPTOMS
VOMITING: 0
RHINORRHEA: 0
COUGH: 1
PALPITATIONS: 0
ABDOMINAL PAIN: 0
CONSTIPATION: 0
NAUSEA: 0
MYALGIAS: 0
FEVER: 0
DIZZINESS: 0
LIGHT-HEADEDNESS: 0
CHILLS: 0
ARTHRALGIAS: 0
FREQUENCY: 0
DIARRHEA: 0
FATIGUE: 0
HEADACHES: 0
SHORTNESS OF BREATH: 0

## 2023-09-11 ASSESSMENT — PATIENT HEALTH QUESTIONNAIRE - PHQ9
SUM OF ALL RESPONSES TO PHQ9 QUESTIONS 1 AND 2: 0
1. LITTLE INTEREST OR PLEASURE IN DOING THINGS: NOT AT ALL
2. FEELING DOWN, DEPRESSED OR HOPELESS: NOT AT ALL

## 2023-09-11 NOTE — PROGRESS NOTES
Subjective   Patient ID: Tien Salcedo is a 28 y.o. male who presents for Cough (Getting worse burning in lungs /Has black mold in house ).    Cough  Pertinent negatives include no chest pain, chills, fever, headaches, myalgias, postnasal drip, rhinorrhea or shortness of breath.        He has been having a minor cough for the past 2-3 weeks or so.  Overall it has just been lingering on.  In the morning he is really having a productive cough with a lot of phlegm.  It feels like he has a burning sensation in his lungs when coughing and then a cool sensation when he breathes in.  He found some black mold in his house and is not sure if this is contributing.    He does have GERD and is not sure if it is related to that.  He is normally dietary controlled but has not been very good with his diet recently.  He had been on pantoprazole in the past. He has followed up with gastroenterology.      Review of Systems   Constitutional:  Negative for chills, fatigue and fever.   HENT:  Negative for congestion, postnasal drip and rhinorrhea.    Respiratory:  Positive for cough. Negative for shortness of breath.    Cardiovascular:  Negative for chest pain and palpitations.   Gastrointestinal:  Negative for abdominal pain, constipation, diarrhea, nausea and vomiting.   Genitourinary:  Negative for frequency.   Musculoskeletal:  Negative for arthralgias and myalgias.   Neurological:  Negative for dizziness, light-headedness and headaches.       Objective   /78   Pulse 84   Temp 36.3 °C (97.3 °F)   Ht 1.829 m (6')   Wt 95.3 kg (210 lb)   SpO2 97%   BMI 28.48 kg/m²     Physical Exam  Vitals and nursing note reviewed.   Constitutional:       General: He is not in acute distress.     Appearance: Normal appearance. He is normal weight. He is not ill-appearing or toxic-appearing.   HENT:      Head: Normocephalic and atraumatic.   Cardiovascular:      Rate and Rhythm: Normal rate and regular rhythm.      Heart sounds:  Normal heart sounds.   Pulmonary:      Effort: Pulmonary effort is normal.      Breath sounds: Normal breath sounds.   Abdominal:      General: Bowel sounds are normal.      Palpations: Abdomen is soft.   Neurological:      Mental Status: He is alert.         Assessment/Plan   Problem List Items Addressed This Visit       GERD (gastroesophageal reflux disease)    Relevant Medications    pantoprazole (ProtoNix) 20 mg EC tablet    cetirizine (ZyrTEC) 10 mg tablet    fluticasone (Flonase) 50 mcg/actuation nasal spray    famotidine (Pepcid) 20 mg tablet     Other Visit Diagnoses       Acute cough    -  Primary    Relevant Medications    cetirizine (ZyrTEC) 10 mg tablet    fluticasone (Flonase) 50 mcg/actuation nasal spray    benzonatate (Tessalon) 100 mg capsule    Other Relevant Orders    Sars-CoV-2 PCR, Symptomatic          History and physical examination as above.  Patient presenting for an acute cough that has been lingering.  Discussed over-the-counter medications including regular use of generic cetirizine, for take his own nasal spray as well as Tessalon Perles.  Did decide to test patient for COVID depending on symptoms.  Uncertain if also related possibly to longstanding gastric reflux that has not been well controlled.  We will do trial of pantoprazole.  Also given prescription for Pepcid that he can reduce to should this help to control his symptoms.  Discussed other signs and symptoms that would require immediate attention in the emergency department versus reevaluation in the office.  He is understanding and in agreement with this plan.

## 2023-09-12 LAB — SARS-COV-2 RESULT: NOT DETECTED

## 2023-10-02 ENCOUNTER — OFFICE VISIT (OUTPATIENT)
Dept: PRIMARY CARE | Facility: CLINIC | Age: 28
End: 2023-10-02
Payer: COMMERCIAL

## 2023-10-02 VITALS
HEART RATE: 86 BPM | WEIGHT: 213 LBS | RESPIRATION RATE: 16 BRPM | TEMPERATURE: 97.8 F | DIASTOLIC BLOOD PRESSURE: 79 MMHG | BODY MASS INDEX: 27.34 KG/M2 | HEIGHT: 74 IN | SYSTOLIC BLOOD PRESSURE: 111 MMHG | OXYGEN SATURATION: 95 %

## 2023-10-02 DIAGNOSIS — R10.33 PERIUMBILICAL ABDOMINAL PAIN: Primary | ICD-10-CM

## 2023-10-02 PROCEDURE — 99214 OFFICE O/P EST MOD 30 MIN: CPT | Performed by: FAMILY MEDICINE

## 2023-10-02 PROCEDURE — 3074F SYST BP LT 130 MM HG: CPT | Performed by: FAMILY MEDICINE

## 2023-10-02 PROCEDURE — 3078F DIAST BP <80 MM HG: CPT | Performed by: FAMILY MEDICINE

## 2023-10-02 PROCEDURE — 1036F TOBACCO NON-USER: CPT | Performed by: FAMILY MEDICINE

## 2023-10-02 ASSESSMENT — PATIENT HEALTH QUESTIONNAIRE - PHQ9
10. IF YOU CHECKED OFF ANY PROBLEMS, HOW DIFFICULT HAVE THESE PROBLEMS MADE IT FOR YOU TO DO YOUR WORK, TAKE CARE OF THINGS AT HOME, OR GET ALONG WITH OTHER PEOPLE: NOT DIFFICULT AT ALL
2. FEELING DOWN, DEPRESSED OR HOPELESS: NOT AT ALL
1. LITTLE INTEREST OR PLEASURE IN DOING THINGS: NOT AT ALL
SUM OF ALL RESPONSES TO PHQ9 QUESTIONS 1 AND 2: 0

## 2023-10-02 ASSESSMENT — ENCOUNTER SYMPTOMS
OCCASIONAL FEELINGS OF UNSTEADINESS: 0
LOSS OF SENSATION IN FEET: 0
DEPRESSION: 0

## 2023-10-03 ASSESSMENT — ENCOUNTER SYMPTOMS
EYE REDNESS: 0
COLOR CHANGE: 0
CHILLS: 0
WOUND: 0
NAUSEA: 0
APPETITE CHANGE: 0
DIAPHORESIS: 0
NERVOUS/ANXIOUS: 0
POLYDIPSIA: 0
JOINT SWELLING: 0
UNEXPECTED WEIGHT CHANGE: 0
EYE PAIN: 0
SLEEP DISTURBANCE: 0
BLOOD IN STOOL: 0
ACTIVITY CHANGE: 0
BACK PAIN: 0
MYALGIAS: 0
EYE DISCHARGE: 0
DYSURIA: 0
AGITATION: 0
DIARRHEA: 0
SINUS PRESSURE: 0
ADENOPATHY: 0
TROUBLE SWALLOWING: 0
FACIAL SWELLING: 0
FEVER: 0
HEMATURIA: 0
RHINORRHEA: 0
WHEEZING: 0
FATIGUE: 0
ABDOMINAL PAIN: 0
VOMITING: 0
POLYPHAGIA: 0
SORE THROAT: 0
CONSTIPATION: 0
SINUS PAIN: 0
SHORTNESS OF BREATH: 0
FLANK PAIN: 0
ARTHRALGIAS: 0
NECK STIFFNESS: 0
EYE ITCHING: 0
COUGH: 0
BRUISES/BLEEDS EASILY: 0
VOICE CHANGE: 0
FREQUENCY: 0
DIZZINESS: 0
CHEST TIGHTNESS: 0
PALPITATIONS: 0

## 2023-10-03 NOTE — PROGRESS NOTES
Subjective   Patient ID: Tien Salcedo is a 28 y.o. male who presents for possible umbilical hernia (Was using a mag to move a couple boxes and felt something).  Today he is accompanied by alone.     HPI  Feels like he has a tear in the abdomen.  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  The other day he was moving a lot of heavy items around his house and felt a tear.  After which she saw some bruising.  Pain has improved significantly.  Bruising has also resolved.  Patient was just concerned about continued tearing or any type of bowel obstruction.  He does not have any symptoms or complaints about a bowel obstruction denies any diarrhea or constipation.  No nausea or vomiting.  No fevers or chills.  No other concerns.  Review of Systems   Constitutional:  Negative for activity change, appetite change, chills, diaphoresis, fatigue, fever and unexpected weight change.   HENT:  Negative for congestion, dental problem, drooling, ear discharge, ear pain, facial swelling, hearing loss, nosebleeds, postnasal drip, rhinorrhea, sinus pressure, sinus pain, sneezing, sore throat, tinnitus, trouble swallowing and voice change.    Eyes:  Negative for pain, discharge, redness, itching and visual disturbance.   Respiratory:  Negative for cough, chest tightness, shortness of breath and wheezing.    Cardiovascular:  Negative for chest pain, palpitations and leg swelling.   Gastrointestinal:  Negative for abdominal pain, blood in stool, constipation, diarrhea, nausea and vomiting.   Endocrine: Negative for cold intolerance, heat intolerance, polydipsia, polyphagia and polyuria.   Genitourinary:  Negative for decreased urine volume, dysuria, flank pain, frequency, hematuria and urgency.   Musculoskeletal:  Negative for arthralgias, back pain, gait problem, joint swelling, myalgias and neck stiffness.   Skin:  Negative for color change, pallor, rash and wound.   Neurological:   "Negative for dizziness.   Hematological:  Negative for adenopathy. Does not bruise/bleed easily.   Psychiatric/Behavioral:  Negative for agitation, behavioral problems and sleep disturbance. The patient is not nervous/anxious.        Objective   Blood Pressure 111/79 (BP Location: Left arm, Patient Position: Sitting, BP Cuff Size: Adult)   Pulse 86   Temperature 36.6 °C (97.8 °F)   Respiration 16   Height 1.88 m (6' 2\")   Weight 96.6 kg (213 lb)   Oxygen Saturation 95%   Body Mass Index 27.35 kg/m²   BSA: 2.25 meters squared  Growth percentiles: Facility age limit for growth %emmett is 20 years. Facility age limit for growth %emmett is 20 years.     Physical Exam  Vitals and nursing note reviewed.   Constitutional:       General: He is not in acute distress.     Appearance: Normal appearance. He is normal weight. He is not ill-appearing.   HENT:      Head: Normocephalic.      Right Ear: Tympanic membrane, ear canal and external ear normal.      Left Ear: Tympanic membrane, ear canal and external ear normal.      Nose: Nose normal. No rhinorrhea.      Mouth/Throat:      Mouth: Mucous membranes are moist.      Pharynx: Oropharynx is clear.   Eyes:      Extraocular Movements: Extraocular movements intact.      Conjunctiva/sclera: Conjunctivae normal.      Pupils: Pupils are equal, round, and reactive to light.   Cardiovascular:      Rate and Rhythm: Normal rate and regular rhythm.      Pulses: Normal pulses.      Heart sounds: Normal heart sounds.   Pulmonary:      Effort: Pulmonary effort is normal. No respiratory distress.      Breath sounds: Normal breath sounds. No wheezing.   Abdominal:      General: Abdomen is flat. Bowel sounds are normal.      Palpations: Abdomen is soft.      Tenderness: There is no abdominal tenderness. There is no guarding or rebound.      Hernia: No hernia is present.   Musculoskeletal:         General: Normal range of motion.      Cervical back: Normal range of motion and neck supple. " No rigidity or tenderness.      Right lower leg: No edema.      Left lower leg: No edema.   Lymphadenopathy:      Cervical: No cervical adenopathy.   Skin:     General: Skin is warm and dry.      Capillary Refill: Capillary refill takes more than 3 seconds.   Neurological:      General: No focal deficit present.      Mental Status: He is alert and oriented to person, place, and time.      Sensory: No sensory deficit.      Motor: No weakness.      Coordination: Coordination normal.   Psychiatric:         Mood and Affect: Mood normal.         Behavior: Behavior normal.         Thought Content: Thought content normal.         Judgment: Judgment normal.         Assessment/Plan   Problem List Items Addressed This Visit    None  Visit Diagnoses       Diagnosis Codes    Periumbilical abdominal pain    -  Primary R10.33        Reassured patient that there is no signs of acute bowel obstruction.  Discussed signs and symptoms of small bowel obstruction with patient.  Abdominal examination was benign  He is moving his bowels and gas appropriately.  If he has any continued or recurrent pain he should call our office immediately or go to the nearest emergency room.

## 2023-10-09 NOTE — PATIENT INSTRUCTIONS
Reassured patient that there is no signs of acute bowel obstruction.  Discussed signs and symptoms of small bowel obstruction with patient.  Abdominal examination was benign  He is moving his bowels and gas appropriately.  If he has any continued or recurrent pain he should call our office immediately or go to the nearest emergency room.

## 2023-12-07 ENCOUNTER — TELEMEDICINE (OUTPATIENT)
Dept: PRIMARY CARE | Facility: CLINIC | Age: 28
End: 2023-12-07
Payer: COMMERCIAL

## 2023-12-07 DIAGNOSIS — U07.1 COVID-19: Primary | ICD-10-CM

## 2023-12-07 PROCEDURE — 99213 OFFICE O/P EST LOW 20 MIN: CPT | Performed by: STUDENT IN AN ORGANIZED HEALTH CARE EDUCATION/TRAINING PROGRAM

## 2023-12-07 ASSESSMENT — ENCOUNTER SYMPTOMS
FATIGUE: 1
CONSTIPATION: 0
VOMITING: 0
HEADACHES: 1
CHILLS: 1
ARTHRALGIAS: 0
DIARRHEA: 0
DIZZINESS: 0
RHINORRHEA: 1
FEVER: 0
MYALGIAS: 1
LIGHT-HEADEDNESS: 0
NAUSEA: 0
SHORTNESS OF BREATH: 0
ABDOMINAL PAIN: 0
COUGH: 1

## 2023-12-07 NOTE — LETTER
December 7, 2023     Patient: Tien Salcedo   YOB: 1995   Date of Visit: 12/7/2023       To Whom It May Concern:    Tien Salcedo was seen in my clinic on 12/7/2023. Please excuse Tien for his absence from work on this day to make the appointment.    Please excuse for any absence due COVID between the dates of 12/2/23-12/11/23. Patient may return to work as long as symptoms mostly resolved on 12/12/23.    If you have any questions or concerns, please don't hesitate to call.         Sincerely,         Noé Godfrey,         CC: No Recipients

## 2023-12-07 NOTE — PROGRESS NOTES
Subjective   Patient ID: Tien Salcedo is a 28 y.o. male who presents for Covid-19 Testing (Symptoms started Saturday/Tested positive Saturday).    HPI     Diagnosed   Hit him with body aches and chills that all came on suddenly.  He slept for most of the next day and woke up with a fever of 106. He went to the ER for the fever since he could not bring it back down.  He was given fluids and toradol in the ER.  He tested positive for COVID at that time.  He called off work for the first few days. He has been working remote for the last 2 days.      Review of Systems   Constitutional:  Positive for chills and fatigue. Negative for fever.   HENT:  Positive for congestion, postnasal drip and rhinorrhea.    Respiratory:  Positive for cough. Negative for shortness of breath.    Cardiovascular:  Negative for chest pain.   Gastrointestinal:  Negative for abdominal pain, constipation, diarrhea, nausea and vomiting.   Musculoskeletal:  Positive for myalgias. Negative for arthralgias.   Neurological:  Positive for headaches. Negative for dizziness and light-headedness.       Objective   There were no vitals taken for this visit.    Physical Exam    Assessment/Plan   Problem List Items Addressed This Visit             ICD-10-CM    COVID-19 - Primary U07.1    Relevant Medications    nirmatrelvir-ritonavir (PAXLOVID) 300 mg (150 mg x 2)-100 mg tablet therapy pack     History and physical examination as above.  Patient presenting for testing positive at home for COVID.  Symptoms as above.  Patient without any acute symptoms.  Discussed medication risks, benefits and side effects of Paxlovid.  Patient is within the window of beginning treatment.  He is agreeable to starting on treatment.  Medication sent to the patient's pharmacy.  Discussed signs and symptoms that would still require immediate attention in the emergency department versus reevaluation in the office even on treatment.  He is understanding and in agreement with  this plan.    This visit was completed via telemedicine (video/telephone)call due to the restrictions of the COVID global pandemic. All issues were discussed and addressed as in the clinical documentation, but no physical exam was performed. If it was felt that the patient should be evaluated in a clinical setting, then they were directed there. The patient verbally consented to the telehealth visit.  Spent 21 minutes with the patient over the telemedicine call and more than 50% of this time was spent in counseling and coordination of care.

## 2023-12-09 DIAGNOSIS — K21.9 GASTROESOPHAGEAL REFLUX DISEASE, UNSPECIFIED WHETHER ESOPHAGITIS PRESENT: ICD-10-CM

## 2023-12-11 RX ORDER — FAMOTIDINE 20 MG/1
20 TABLET, FILM COATED ORAL 2 TIMES DAILY
Qty: 180 TABLET | Refills: 0 | Status: SHIPPED | OUTPATIENT
Start: 2023-12-11

## 2024-02-12 ENCOUNTER — TELEMEDICINE (OUTPATIENT)
Dept: PRIMARY CARE | Facility: CLINIC | Age: 29
End: 2024-02-12
Payer: COMMERCIAL

## 2024-02-12 DIAGNOSIS — R11.2 NAUSEA AND VOMITING, UNSPECIFIED VOMITING TYPE: Primary | ICD-10-CM

## 2024-02-12 DIAGNOSIS — R19.7 DIARRHEA, UNSPECIFIED TYPE: ICD-10-CM

## 2024-02-12 PROCEDURE — 99214 OFFICE O/P EST MOD 30 MIN: CPT | Performed by: FAMILY MEDICINE

## 2024-02-12 PROCEDURE — 1036F TOBACCO NON-USER: CPT | Performed by: FAMILY MEDICINE

## 2024-02-12 RX ORDER — ONDANSETRON 4 MG/1
4 TABLET, ORALLY DISINTEGRATING ORAL EVERY 8 HOURS PRN
Qty: 20 TABLET | Refills: 0 | Status: SHIPPED | OUTPATIENT
Start: 2024-02-12 | End: 2024-02-19

## 2024-02-12 ASSESSMENT — ENCOUNTER SYMPTOMS
ANOREXIA: 0
SWOLLEN GLANDS: 0
VERTIGO: 0
FEVER: 1
FLU SYMPTOMS: 1
FATIGUE: 1
SORE THROAT: 1
JOINT SWELLING: 0
VISUAL CHANGE: 0
COUGH: 1
ABDOMINAL PAIN: 1
ARTHRALGIAS: 0
NUMBNESS: 0
MYALGIAS: 0
VOMITING: 1
CHANGE IN BOWEL HABIT: 1
WEAKNESS: 1
DIAPHORESIS: 1
NAUSEA: 1
CHILLS: 1
HEADACHES: 1
NECK PAIN: 0

## 2024-02-12 NOTE — PATIENT INSTRUCTIONS
- trial of Zofran   - Off work note for this week  - Clears to day and progress to BRAT diet  - Patient to report to ED if develops chest pain, SOB, or fevers unrelieved by tyelnol/advil

## 2024-02-12 NOTE — LETTER
February 12, 2024     Patient: Tien Salcedo   YOB: 1995   Date of Visit: 2/12/2024       To Whom It May Concern:    Tien Salcedo was seen in my clinic on 2/12/2024 at 3:40 pm. Please excuse Tien for his absence from work on this day to make the appointment.    If you have any questions or concerns, please don't hesitate to call.         Sincerely,         Morena West MD        CC: No Recipients

## 2024-02-12 NOTE — PROGRESS NOTES
Subjective   Patient ID: Tien Salcedo is a 28 y.o. male who presents for Flu Symptoms (No COVID test. Symptoms since this morning. Vomiting, muscle aches, diarrhea).  Today he is accompanied by alone.       Seen via Audio-visual with patient's consent. I performed this visit using real-time telehealth tools, including an audio/video connection between patient and [facility].    Flu Symptoms  This is a new problem. The problem has been rapidly worsening. Associated symptoms include abdominal pain, a change in bowel habit, chills, congestion, coughing, diaphoresis, fatigue, a fever, headaches, nausea, a sore throat, vomiting and weakness. Pertinent negatives include no anorexia, arthralgias, joint swelling, myalgias, neck pain, numbness, rash, swollen glands, urinary symptoms, vertigo or visual change. Exacerbated by: sick contacts. He has tried nothing for the symptoms. The treatment provided no relief.       Review of Systems   Constitutional:  Positive for chills, diaphoresis, fatigue and fever.   HENT:  Positive for congestion and sore throat.    Respiratory:  Positive for cough.    Gastrointestinal:  Positive for abdominal pain, change in bowel habit, nausea and vomiting. Negative for anorexia.   Musculoskeletal:  Negative for arthralgias, joint swelling, myalgias and neck pain.   Skin:  Negative for rash.   Neurological:  Positive for weakness and headaches. Negative for vertigo and numbness.       Objective   There were no vitals taken for this visit.  BSA: There is no height or weight on file to calculate BSA.  Growth percentiles: Facility age limit for growth %emmett is 20 years. Facility age limit for growth %emmett is 20 years.     Physical Exam  Vitals reviewed.   Constitutional:       Appearance: Normal appearance. He is normal weight.   Pulmonary:      Effort: Pulmonary effort is normal. No respiratory distress.      Breath sounds: Normal breath sounds. No rhonchi.   Neurological:       Mental Status: He is alert.   Psychiatric:         Mood and Affect: Mood normal.         Behavior: Behavior normal.         Thought Content: Thought content normal.         Judgment: Judgment normal.         Assessment/Plan   Problem List Items Addressed This Visit    None  Visit Diagnoses       Diagnosis Codes    Nausea and vomiting, unspecified vomiting type    -  Primary R11.2    Relevant Medications    ondansetron ODT (Zofran-ODT) 4 mg disintegrating tablet    Diarrhea, unspecified type     R19.7          Current Outpatient Medications   Medication Sig Dispense Refill    famotidine (Pepcid) 20 mg tablet TAKE 1 TABLET BY MOUTH TWICE A  tablet 0    fluticasone (Flonase) 50 mcg/actuation nasal spray Administer 1 spray into each nostril once daily. Shake gently. Before first use, prime pump. After use, clean tip and replace cap. 16 g 1    sildenafil (Viagra) 50 mg tablet Take 1 tablet (50 mg) by mouth once daily as needed for erectile dysfunction. 12 tablet 0    cetirizine (ZyrTEC) 10 mg tablet Take 1 tablet (10 mg) by mouth once daily. 90 tablet 0    ondansetron ODT (Zofran-ODT) 4 mg disintegrating tablet Take 1 tablet (4 mg) by mouth every 8 hours if needed for nausea or vomiting for up to 7 days. 20 tablet 0    pantoprazole (ProtoNix) 20 mg EC tablet Take 1 tablet (20 mg) by mouth once daily in the morning. Take before meals. Do not crush, chew, or split. 30 tablet 0    tolnaftate (Tinactin) 1 % powder Apply topically 2 times a day. (Patient not taking: Reported on 2/12/2024) 108 g 0     No current facility-administered medications for this visit.     - trial of Zofran   - Off work note for this week  - Clears to day and progress to BRAT diet  - Patient to report to ED if develops chest pain, SOB, or fevers unrelieved by tyelnol/advil

## 2024-03-27 ENCOUNTER — OFFICE VISIT (OUTPATIENT)
Dept: PRIMARY CARE | Facility: CLINIC | Age: 29
End: 2024-03-27
Payer: COMMERCIAL

## 2024-03-27 VITALS
DIASTOLIC BLOOD PRESSURE: 83 MMHG | BODY MASS INDEX: 27.22 KG/M2 | OXYGEN SATURATION: 95 % | SYSTOLIC BLOOD PRESSURE: 118 MMHG | WEIGHT: 212 LBS | RESPIRATION RATE: 16 BRPM | TEMPERATURE: 98.2 F | HEART RATE: 105 BPM

## 2024-03-27 DIAGNOSIS — J01.10 ACUTE NON-RECURRENT FRONTAL SINUSITIS: ICD-10-CM

## 2024-03-27 DIAGNOSIS — J03.91 RECURRENT TONSILLITIS: ICD-10-CM

## 2024-03-27 DIAGNOSIS — Z00.00 HEALTHCARE MAINTENANCE: ICD-10-CM

## 2024-03-27 DIAGNOSIS — K21.9 GASTROESOPHAGEAL REFLUX DISEASE, UNSPECIFIED WHETHER ESOPHAGITIS PRESENT: ICD-10-CM

## 2024-03-27 DIAGNOSIS — R05.1 ACUTE COUGH: ICD-10-CM

## 2024-03-27 DIAGNOSIS — Z71.89 COUNSELING ON HEALTH PROMOTION AND DISEASE PREVENTION: Primary | ICD-10-CM

## 2024-03-27 PROCEDURE — 3074F SYST BP LT 130 MM HG: CPT | Performed by: NURSE PRACTITIONER

## 2024-03-27 PROCEDURE — 3079F DIAST BP 80-89 MM HG: CPT | Performed by: NURSE PRACTITIONER

## 2024-03-27 PROCEDURE — 99213 OFFICE O/P EST LOW 20 MIN: CPT | Performed by: NURSE PRACTITIONER

## 2024-03-27 PROCEDURE — 1036F TOBACCO NON-USER: CPT | Performed by: NURSE PRACTITIONER

## 2024-03-27 RX ORDER — AMOXICILLIN AND CLAVULANATE POTASSIUM 875; 125 MG/1; MG/1
875 TABLET, FILM COATED ORAL 2 TIMES DAILY
Qty: 20 TABLET | Refills: 0 | Status: SHIPPED | OUTPATIENT
Start: 2024-03-27 | End: 2024-04-06

## 2024-03-27 RX ORDER — FLUTICASONE PROPIONATE 50 MCG
1 SPRAY, SUSPENSION (ML) NASAL DAILY
Qty: 16 G | Refills: 1 | Status: SHIPPED | OUTPATIENT
Start: 2024-03-27 | End: 2024-04-19

## 2024-03-27 RX ORDER — CETIRIZINE HYDROCHLORIDE 10 MG/1
10 TABLET ORAL DAILY
Qty: 90 TABLET | Refills: 0 | Status: SHIPPED | OUTPATIENT
Start: 2024-03-27 | End: 2024-06-25

## 2024-03-27 ASSESSMENT — ENCOUNTER SYMPTOMS
DEPRESSION: 0
FEVER: 0
RHINORRHEA: 1
COUGH: 0
VOMITING: 0
CHILLS: 0
NAUSEA: 0
SORE THROAT: 1
PALPITATIONS: 0
OCCASIONAL FEELINGS OF UNSTEADINESS: 0
ABDOMINAL PAIN: 0
SHORTNESS OF BREATH: 0
SINUS PAIN: 0
DIARRHEA: 0
LOSS OF SENSATION IN FEET: 0
SINUS PRESSURE: 0
WHEEZING: 0

## 2024-03-27 ASSESSMENT — PATIENT HEALTH QUESTIONNAIRE - PHQ9
2. FEELING DOWN, DEPRESSED OR HOPELESS: NOT AT ALL
10. IF YOU CHECKED OFF ANY PROBLEMS, HOW DIFFICULT HAVE THESE PROBLEMS MADE IT FOR YOU TO DO YOUR WORK, TAKE CARE OF THINGS AT HOME, OR GET ALONG WITH OTHER PEOPLE: NOT DIFFICULT AT ALL
1. LITTLE INTEREST OR PLEASURE IN DOING THINGS: NOT AT ALL
SUM OF ALL RESPONSES TO PHQ9 QUESTIONS 1 AND 2: 0
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
## 2024-03-27 NOTE — PATIENT INSTRUCTIONS
Antibiotic sent to pharmacy  Advised patient to push fluids and start use of cool mist humidifier  Patient to start using flonase nasal spray  Patient to call if develop new or worsening symptoms   Referral to ENT placed for recurrent tonsilitis   Schedule annual physical exam  Obtain fasting blood work 1 week prior to your physical exam

## 2024-03-29 ENCOUNTER — TELEPHONE (OUTPATIENT)
Dept: PRIMARY CARE | Facility: CLINIC | Age: 29
End: 2024-03-29
Payer: COMMERCIAL

## 2024-04-18 DIAGNOSIS — R05.1 ACUTE COUGH: ICD-10-CM

## 2024-04-18 DIAGNOSIS — K21.9 GASTROESOPHAGEAL REFLUX DISEASE, UNSPECIFIED WHETHER ESOPHAGITIS PRESENT: ICD-10-CM

## 2024-04-19 RX ORDER — FLUTICASONE PROPIONATE 50 MCG
1 SPRAY, SUSPENSION (ML) NASAL DAILY
Qty: 48 ML | Refills: 1 | Status: SHIPPED | OUTPATIENT
Start: 2024-04-19

## 2024-05-10 ENCOUNTER — APPOINTMENT (OUTPATIENT)
Dept: PRIMARY CARE | Facility: CLINIC | Age: 29
End: 2024-05-10
Payer: COMMERCIAL

## 2024-05-31 ENCOUNTER — LAB (OUTPATIENT)
Dept: LAB | Facility: LAB | Age: 29
End: 2024-05-31
Payer: COMMERCIAL

## 2024-05-31 DIAGNOSIS — Z00.00 HEALTHCARE MAINTENANCE: ICD-10-CM

## 2024-05-31 LAB
ALBUMIN SERPL BCP-MCNC: 4.8 G/DL (ref 3.4–5)
ALP SERPL-CCNC: 65 U/L (ref 33–120)
ALT SERPL W P-5'-P-CCNC: 26 U/L (ref 10–52)
ANION GAP SERPL CALC-SCNC: 13 MMOL/L (ref 10–20)
AST SERPL W P-5'-P-CCNC: 16 U/L (ref 9–39)
BASOPHILS # BLD AUTO: 0.04 X10*3/UL (ref 0–0.1)
BASOPHILS NFR BLD AUTO: 0.5 %
BILIRUB SERPL-MCNC: 0.7 MG/DL (ref 0–1.2)
BUN SERPL-MCNC: 31 MG/DL (ref 6–23)
CALCIUM SERPL-MCNC: 10.1 MG/DL (ref 8.6–10.6)
CHLORIDE SERPL-SCNC: 101 MMOL/L (ref 98–107)
CHOLEST SERPL-MCNC: 185 MG/DL (ref 0–199)
CHOLESTEROL/HDL RATIO: 4.7
CO2 SERPL-SCNC: 31 MMOL/L (ref 21–32)
CREAT SERPL-MCNC: 1.09 MG/DL (ref 0.5–1.3)
EGFRCR SERPLBLD CKD-EPI 2021: >90 ML/MIN/1.73M*2
EOSINOPHIL # BLD AUTO: 0.13 X10*3/UL (ref 0–0.7)
EOSINOPHIL NFR BLD AUTO: 1.7 %
ERYTHROCYTE [DISTWIDTH] IN BLOOD BY AUTOMATED COUNT: 12.8 % (ref 11.5–14.5)
GLUCOSE SERPL-MCNC: 99 MG/DL (ref 74–99)
HCT VFR BLD AUTO: 50.7 % (ref 41–52)
HDLC SERPL-MCNC: 39.3 MG/DL
HGB BLD-MCNC: 17.7 G/DL (ref 13.5–17.5)
IMM GRANULOCYTES # BLD AUTO: 0.03 X10*3/UL (ref 0–0.7)
IMM GRANULOCYTES NFR BLD AUTO: 0.4 % (ref 0–0.9)
LDLC SERPL CALC-MCNC: 131 MG/DL
LYMPHOCYTES # BLD AUTO: 1.44 X10*3/UL (ref 1.2–4.8)
LYMPHOCYTES NFR BLD AUTO: 18.3 %
MCH RBC QN AUTO: 30.9 PG (ref 26–34)
MCHC RBC AUTO-ENTMCNC: 34.9 G/DL (ref 32–36)
MCV RBC AUTO: 89 FL (ref 80–100)
MONOCYTES # BLD AUTO: 0.58 X10*3/UL (ref 0.1–1)
MONOCYTES NFR BLD AUTO: 7.4 %
NEUTROPHILS # BLD AUTO: 5.63 X10*3/UL (ref 1.2–7.7)
NEUTROPHILS NFR BLD AUTO: 71.7 %
NON HDL CHOLESTEROL: 146 MG/DL (ref 0–149)
NRBC BLD-RTO: 0 /100 WBCS (ref 0–0)
PLATELET # BLD AUTO: 198 X10*3/UL (ref 150–450)
POTASSIUM SERPL-SCNC: 4.7 MMOL/L (ref 3.5–5.3)
PROT SERPL-MCNC: 7.7 G/DL (ref 6.4–8.2)
RBC # BLD AUTO: 5.72 X10*6/UL (ref 4.5–5.9)
SODIUM SERPL-SCNC: 140 MMOL/L (ref 136–145)
TRIGL SERPL-MCNC: 76 MG/DL (ref 0–149)
TSH SERPL-ACNC: 1.25 MIU/L (ref 0.44–3.98)
VLDL: 15 MG/DL (ref 0–40)
WBC # BLD AUTO: 7.9 X10*3/UL (ref 4.4–11.3)

## 2024-05-31 PROCEDURE — 85025 COMPLETE CBC W/AUTO DIFF WBC: CPT

## 2024-05-31 PROCEDURE — 84443 ASSAY THYROID STIM HORMONE: CPT

## 2024-05-31 PROCEDURE — 84153 ASSAY OF PSA TOTAL: CPT

## 2024-05-31 PROCEDURE — 36415 COLL VENOUS BLD VENIPUNCTURE: CPT

## 2024-05-31 PROCEDURE — 80061 LIPID PANEL: CPT

## 2024-05-31 PROCEDURE — 80053 COMPREHEN METABOLIC PANEL: CPT

## 2024-05-31 PROCEDURE — 84154 ASSAY OF PSA FREE: CPT

## 2024-06-01 LAB
PSA FREE MFR SERPL: 29 %
PSA FREE SERPL-MCNC: 0.2 NG/ML
PSA SERPL IA-MCNC: 0.7 NG/ML (ref 0–4)

## 2024-06-06 ENCOUNTER — OFFICE VISIT (OUTPATIENT)
Dept: PRIMARY CARE | Facility: CLINIC | Age: 29
End: 2024-06-06
Payer: COMMERCIAL

## 2024-06-06 VITALS
HEIGHT: 72 IN | OXYGEN SATURATION: 97 % | WEIGHT: 213 LBS | TEMPERATURE: 97.3 F | HEART RATE: 87 BPM | BODY MASS INDEX: 28.85 KG/M2 | SYSTOLIC BLOOD PRESSURE: 115 MMHG | DIASTOLIC BLOOD PRESSURE: 77 MMHG

## 2024-06-06 DIAGNOSIS — S16.1XXD STRAIN OF NECK MUSCLE, SUBSEQUENT ENCOUNTER: ICD-10-CM

## 2024-06-06 DIAGNOSIS — R73.01 ELEVATED FASTING GLUCOSE: ICD-10-CM
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
## 2024-06-06 DIAGNOSIS — R79.89 ELEVATED SERUM CREATININE: ICD-10-CM

## 2024-06-06 DIAGNOSIS — S29.019D THORACIC MYOFASCIAL STRAIN, SUBSEQUENT ENCOUNTER: ICD-10-CM

## 2024-06-06 DIAGNOSIS — E78.00 ELEVATED LDL CHOLESTEROL LEVEL: ICD-10-CM

## 2024-06-06 DIAGNOSIS — K42.9 UMBILICAL HERNIA WITHOUT OBSTRUCTION AND WITHOUT GANGRENE: ICD-10-CM

## 2024-06-06 DIAGNOSIS — S39.012D STRAIN OF LUMBAR REGION, SUBSEQUENT ENCOUNTER: ICD-10-CM

## 2024-06-06 DIAGNOSIS — D58.2 ELEVATED HEMOGLOBIN (CMS-HCC): ICD-10-CM

## 2024-06-06 DIAGNOSIS — R79.9 ELEVATED BLOOD UREA NITROGEN: ICD-10-CM

## 2024-06-06 PROCEDURE — 99214 OFFICE O/P EST MOD 30 MIN: CPT | Performed by: STUDENT IN AN ORGANIZED HEALTH CARE EDUCATION/TRAINING PROGRAM

## 2024-06-06 PROCEDURE — 3074F SYST BP LT 130 MM HG: CPT | Performed by: STUDENT IN AN ORGANIZED HEALTH CARE EDUCATION/TRAINING PROGRAM

## 2024-06-06 PROCEDURE — 3078F DIAST BP <80 MM HG: CPT | Performed by: STUDENT IN AN ORGANIZED HEALTH CARE EDUCATION/TRAINING PROGRAM

## 2024-06-06 RX ORDER — CYCLOBENZAPRINE HCL 5 MG
5 TABLET ORAL NIGHTLY PRN
Qty: 30 TABLET | Refills: 0 | Status: SHIPPED | OUTPATIENT
Start: 2024-06-06 | End: 2024-08-05

## 2024-06-06 RX ORDER — METHYLPREDNISOLONE 4 MG/1
TABLET ORAL
Qty: 21 TABLET | Refills: 0 | Status: SHIPPED | OUTPATIENT
Start: 2024-06-06 | End: 2024-06-13

## 2024-06-06 ASSESSMENT — ENCOUNTER SYMPTOMS
FATIGUE: 0
CHILLS: 0
RHINORRHEA: 0
SHORTNESS OF BREATH: 0
ABDOMINAL PAIN: 0
ARTHRALGIAS: 0
MYALGIAS: 1
FEVER: 0
BACK PAIN: 1
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
## 2024-06-12 ASSESSMENT — ENCOUNTER SYMPTOMS
NAUSEA: 0
PHOTOPHOBIA: 0
HEADACHES: 0
LIGHT-HEADEDNESS: 0
DIZZINESS: 0

## 2024-06-14 ENCOUNTER — APPOINTMENT (OUTPATIENT)
Dept: PRIMARY CARE | Facility: CLINIC | Age: 29
End: 2024-06-14
Payer: COMMERCIAL

## 2024-06-14 VITALS
OXYGEN SATURATION: 96 % | HEART RATE: 89 BPM | SYSTOLIC BLOOD PRESSURE: 107 MMHG | DIASTOLIC BLOOD PRESSURE: 76 MMHG | BODY MASS INDEX: 29.02 KG/M2 | WEIGHT: 214 LBS

## 2024-06-14 DIAGNOSIS — S16.1XXD STRAIN OF NECK MUSCLE, SUBSEQUENT ENCOUNTER: ICD-10-CM

## 2024-06-14 DIAGNOSIS — S29.019D THORACIC MYOFASCIAL STRAIN, SUBSEQUENT ENCOUNTER: ICD-10-CM

## 2024-06-14 DIAGNOSIS — S06.0X0A CONCUSSION WITHOUT LOSS OF CONSCIOUSNESS, INITIAL ENCOUNTER: ICD-10-CM
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
## 2024-06-14 DIAGNOSIS — S61.212D LACERATION OF RIGHT MIDDLE FINGER WITHOUT FOREIGN BODY WITHOUT DAMAGE TO NAIL, SUBSEQUENT ENCOUNTER: ICD-10-CM

## 2024-06-14 PROCEDURE — 15853 REMOVAL SUTR/STAPL XREQ ANES: CPT | Performed by: STUDENT IN AN ORGANIZED HEALTH CARE EDUCATION/TRAINING PROGRAM

## 2024-06-14 PROCEDURE — 3074F SYST BP LT 130 MM HG: CPT | Performed by: STUDENT IN AN ORGANIZED HEALTH CARE EDUCATION/TRAINING PROGRAM

## 2024-06-14 PROCEDURE — 3078F DIAST BP <80 MM HG: CPT | Performed by: STUDENT IN AN ORGANIZED HEALTH CARE EDUCATION/TRAINING PROGRAM

## 2024-06-14 PROCEDURE — 99214 OFFICE O/P EST MOD 30 MIN: CPT | Performed by: STUDENT IN AN ORGANIZED HEALTH CARE EDUCATION/TRAINING PROGRAM

## 2024-06-14 ASSESSMENT — ENCOUNTER SYMPTOMS
MYALGIAS: 1
NAUSEA: 0
VOMITING: 0
FEVER: 0
ABDOMINAL PAIN: 0
CHILLS: 0
SHORTNESS OF BREATH: 0
LIGHT-HEADEDNESS: 0
PHOTOPHOBIA: 1
RHINORRHEA: 0
FATIGUE: 1
ARTHRALGIAS: 0
BACK PAIN: 1
HEADACHES: 1
DIARRHEA: 0
CONSTIPATION: 0
DIZZINESS: 0

## 2024-06-14 NOTE — LETTER
June 14, 2024     Patient: Tien Salcedo   YOB: 1995   Date of Visit: 6/14/2024       To Whom It May Concern:    Tien Salcedo was seen in my clinic on 6/14/2024 at 10:40 am. Please excuse Tien for his absence from work on this day to make the appointment.    Patient was diagnosed with a concussion clinically.  Additional treatment and follow-up in therapy is recommended for the patient.  Due to severity of symptoms, we are recommending patient remain off of work until reevaluation when further dictation of therapy will be decided at that time.    If you have any questions or concerns, please don't hesitate to call.         Sincerely,         Noé Godfrey,         CC: No Recipients
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
## 2024-06-22 DIAGNOSIS — K21.9 GASTROESOPHAGEAL REFLUX DISEASE, UNSPECIFIED WHETHER ESOPHAGITIS PRESENT: ICD-10-CM

## 2024-06-22 DIAGNOSIS — R05.1 ACUTE COUGH: ICD-10-CM

## 2024-06-23 RX ORDER — CETIRIZINE HYDROCHLORIDE 10 MG/1
10 TABLET ORAL DAILY
Qty: 90 TABLET | Refills: 0 | Status: SHIPPED | OUTPATIENT
Start: 2024-06-23

## 2024-06-25 ENCOUNTER — APPOINTMENT (OUTPATIENT)
Dept: PRIMARY CARE | Facility: CLINIC | Age: 29
End: 2024-06-25
Payer: COMMERCIAL

## 2024-06-25 VITALS
OXYGEN SATURATION: 96 % | RESPIRATION RATE: 16 BRPM | BODY MASS INDEX: 28.62 KG/M2 | WEIGHT: 211 LBS | SYSTOLIC BLOOD PRESSURE: 127 MMHG | DIASTOLIC BLOOD PRESSURE: 84 MMHG | HEART RATE: 108 BPM

## 2024-06-25 DIAGNOSIS — S06.0X0A CONCUSSION WITHOUT LOSS OF CONSCIOUSNESS, INITIAL ENCOUNTER: Primary | ICD-10-CM

## 2024-06-25 DIAGNOSIS — G44.309 POST-CONCUSSION HEADACHE: ICD-10-CM

## 2024-06-25 DIAGNOSIS — S29.019D THORACIC MYOFASCIAL STRAIN, SUBSEQUENT ENCOUNTER: ICD-10-CM

## 2024-06-25 DIAGNOSIS — S16.1XXD STRAIN OF NECK MUSCLE, SUBSEQUENT ENCOUNTER: ICD-10-CM
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
## 2024-06-25 DIAGNOSIS — R11.0 NAUSEA: ICD-10-CM

## 2024-06-25 PROCEDURE — 3074F SYST BP LT 130 MM HG: CPT | Performed by: STUDENT IN AN ORGANIZED HEALTH CARE EDUCATION/TRAINING PROGRAM

## 2024-06-25 PROCEDURE — 99214 OFFICE O/P EST MOD 30 MIN: CPT | Performed by: STUDENT IN AN ORGANIZED HEALTH CARE EDUCATION/TRAINING PROGRAM

## 2024-06-25 PROCEDURE — 3079F DIAST BP 80-89 MM HG: CPT | Performed by: STUDENT IN AN ORGANIZED HEALTH CARE EDUCATION/TRAINING PROGRAM

## 2024-06-25 RX ORDER — ONDANSETRON 4 MG/1
4 TABLET, ORALLY DISINTEGRATING ORAL EVERY 8 HOURS PRN
Qty: 20 TABLET | Refills: 0 | Status: SHIPPED | OUTPATIENT
Start: 2024-06-25 | End: 2024-07-06 | Stop reason: SDUPTHER

## 2024-06-25 RX ORDER — SUMATRIPTAN 50 MG/1
50 TABLET, FILM COATED ORAL ONCE AS NEEDED
Qty: 6 TABLET | Refills: 0 | Status: SHIPPED | OUTPATIENT
Start: 2024-06-25 | End: 2024-07-06 | Stop reason: SDUPTHER

## 2024-06-25 ASSESSMENT — ENCOUNTER SYMPTOMS
HEADACHES: 1
MYALGIAS: 1
LIGHT-HEADEDNESS: 0
ABDOMINAL PAIN: 0
VOMITING: 0
FATIGUE: 1
PHOTOPHOBIA: 1
CHILLS: 0
NAUSEA: 0
BACK PAIN: 1
RHINORRHEA: 0
FEVER: 0
SHORTNESS OF BREATH: 0
DIARRHEA: 0
ARTHRALGIAS: 0
CONSTIPATION: 0
DIZZINESS: 0
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
## 2024-06-25 NOTE — PATIENT INSTRUCTIONS
We discussed her continued symptoms in the office.    I would like you to continue with the physical therapy as this should help.  I would go ahead and limit some of the stuff that the chiropractor is doing is on not sure how much benefit is offering it seems like some of the stuff is increasing her symptoms.  I sent in medications to try to help with the nausea including the Zofran.  He can take this as directed.  I tried sending in a triptan medication to see if this helps with some of the headaches that you are having.  The directions are on the bottle.  If this does not help, we do not need to offer refills but let me know if it does not I can send an additional supply to your pharmacy.    I have been asked see you back in the office for an appointment on 7/5/2024.  Please check your appointment on your MyChart and call us back if we need to reschedule it since you mentioned you have an eye exam that day.  We can see you after that time even if we need to move it towards the end of the day.    I am also going to place a referral to a concussion clinic we will try to have that faxed over to them today.  Please let me know if you do not hear from them before the end of the week about getting scheduled.  I am not sure how far out they are scheduling so I want to make sure that we get that ordered today just in case you do not start having adequate improvement with some of the other therapies.    Please call sooner with any other acute concerns or complaints.    Thank you

## 2024-06-30 ASSESSMENT — ENCOUNTER SYMPTOMS
NUMBNESS: 0
NERVOUS/ANXIOUS: 1
SLEEP DISTURBANCE: 1
WEAKNESS: 0
SLEEP DISTURBANCE: 1
HYPERACTIVE: 0
CONFUSION: 0
WEAKNESS: 0
DECREASED CONCENTRATION: 1
DECREASED CONCENTRATION: 1
NUMBNESS: 0
HYPERACTIVE: 0
NERVOUS/ANXIOUS: 1
CONFUSION: 0

## 2024-07-05 ENCOUNTER — APPOINTMENT (OUTPATIENT)
Dept: PRIMARY CARE | Facility: CLINIC | Age: 29
End: 2024-07-05
Payer: COMMERCIAL

## 2024-07-06 ENCOUNTER — OFFICE VISIT (OUTPATIENT)
Dept: PRIMARY CARE | Facility: CLINIC | Age: 29
End: 2024-07-06
Payer: COMMERCIAL

## 2024-07-06 VITALS
WEIGHT: 216 LBS | SYSTOLIC BLOOD PRESSURE: 121 MMHG | OXYGEN SATURATION: 96 % | TEMPERATURE: 97.6 F | HEART RATE: 81 BPM | DIASTOLIC BLOOD PRESSURE: 77 MMHG | BODY MASS INDEX: 29.29 KG/M2
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
## 2024-07-06 DIAGNOSIS — R11.0 NAUSEA: ICD-10-CM

## 2024-07-06 DIAGNOSIS — S16.1XXD STRAIN OF NECK MUSCLE, SUBSEQUENT ENCOUNTER: ICD-10-CM

## 2024-07-06 DIAGNOSIS — S29.019D THORACIC MYOFASCIAL STRAIN, SUBSEQUENT ENCOUNTER: ICD-10-CM

## 2024-07-06 DIAGNOSIS — G44.309 POST-CONCUSSION HEADACHE: ICD-10-CM

## 2024-07-06 DIAGNOSIS — R09.81 SINUS CONGESTION: ICD-10-CM

## 2024-07-06 DIAGNOSIS — S06.0X0A CONCUSSION WITHOUT LOSS OF CONSCIOUSNESS, INITIAL ENCOUNTER: Primary | ICD-10-CM

## 2024-07-06 PROCEDURE — 1036F TOBACCO NON-USER: CPT | Performed by: STUDENT IN AN ORGANIZED HEALTH CARE EDUCATION/TRAINING PROGRAM

## 2024-07-06 PROCEDURE — 3074F SYST BP LT 130 MM HG: CPT | Performed by: STUDENT IN AN ORGANIZED HEALTH CARE EDUCATION/TRAINING PROGRAM

## 2024-07-06 PROCEDURE — 3078F DIAST BP <80 MM HG: CPT | Performed by: STUDENT IN AN ORGANIZED HEALTH CARE EDUCATION/TRAINING PROGRAM

## 2024-07-06 PROCEDURE — 99213 OFFICE O/P EST LOW 20 MIN: CPT | Performed by: STUDENT IN AN ORGANIZED HEALTH CARE EDUCATION/TRAINING PROGRAM

## 2024-07-06 RX ORDER — ONDANSETRON 4 MG/1
4 TABLET, ORALLY DISINTEGRATING ORAL EVERY 8 HOURS PRN
Qty: 20 TABLET | Refills: 0 | Status: SHIPPED | OUTPATIENT
Start: 2024-07-06

## 2024-07-06 RX ORDER — SUMATRIPTAN 50 MG/1
50 TABLET, FILM COATED ORAL ONCE AS NEEDED
Qty: 6 TABLET | Refills: 0 | Status: SHIPPED | OUTPATIENT
Start: 2024-07-06 | End: 2025-07-06

## 2024-07-06 RX ORDER — METHYLPREDNISOLONE 4 MG/1
TABLET ORAL
Qty: 21 TABLET | Refills: 0 | Status: SHIPPED | OUTPATIENT
Start: 2024-07-06 | End: 2024-07-13

## 2024-07-06 ASSESSMENT — ENCOUNTER SYMPTOMS
NAUSEA: 0
DIZZINESS: 0
ARTHRALGIAS: 0
WEAKNESS: 0
FEVER: 0
DIARRHEA: 0
FATIGUE: 1
ABDOMINAL PAIN: 0
DECREASED CONCENTRATION: 1
BACK PAIN: 1
CONSTIPATION: 0
LIGHT-HEADEDNESS: 0
HEADACHES: 1
RHINORRHEA: 0
HYPERACTIVE: 0
NERVOUS/ANXIOUS: 1
CHILLS: 0
MYALGIAS: 1
SLEEP DISTURBANCE: 1
NUMBNESS: 0
VOMITING: 0
CONFUSION: 0
SHORTNESS OF BREATH: 0
PHOTOPHOBIA: 1

## 2024-07-06 NOTE — PATIENT INSTRUCTIONS
I went ahead and resent the medication sent.    I sent in for Zofran that you can take as directed and as needed for nausea.  I sent in a prescription for sumatriptan/Imitrex which is normally for migraine headaches.  We can see if this does help and he can follow the directions on the medication.  If it does, I can send in more.    I did send in for a steroid pack as well to see if that helps your symptoms.  I would try other medications first before resorting to that.  Steroids have been used for concussions for a long time but usually we try not to use them right away.  Since it has been a while, you may find a benefit to this.  If not then we would not represcribe it again.    I will have them try to read fax the order to the concussion clinic at the [facility] for neurologist.  Please call back by Tuesday or Wednesday next week if you do not hear back from them about getting set up for an appointment.    Thank you
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
## 2024-07-15 ENCOUNTER — TELEPHONE (OUTPATIENT)
Dept: PRIMARY CARE | Facility: CLINIC | Age: 29
End: 2024-07-15
Payer: COMMERCIAL

## 2024-07-16 NOTE — TELEPHONE ENCOUNTER
Pt was not sure which office contacted him.  Referral faxed to [facility]

## 2024-07-23 DIAGNOSIS — R11.0 NAUSEA: ICD-10-CM
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
## 2024-07-23 DIAGNOSIS — G44.309 POST-CONCUSSION HEADACHE: ICD-10-CM

## 2024-07-23 DIAGNOSIS — S06.0X0A CONCUSSION WITHOUT LOSS OF CONSCIOUSNESS, INITIAL ENCOUNTER: Primary | ICD-10-CM

## 2024-07-25 ENCOUNTER — TELEPHONE (OUTPATIENT)
Dept: PRIMARY CARE | Facility: CLINIC | Age: 29
End: 2024-07-25
Payer: COMMERCIAL

## 2024-07-25 NOTE — TELEPHONE ENCOUNTER
Tien called in asking if his short term disability paperwork was faxed here and if it is possibly completed for him to .  He needs this completed by 7/30/24 Please advise.    Pt: [phone]

## 2024-07-29 ENCOUNTER — TELEPHONE (OUTPATIENT)
Dept: PRIMARY CARE | Facility: CLINIC | Age: 29
End: 2024-07-29
Payer: COMMERCIAL

## 2024-07-29 NOTE — TELEPHONE ENCOUNTER
Pt called in about [place] paperwork and stated he has to have it turned in by tomorrow he wanted to know if it was ready for pickup.

## 2024-08-02 ENCOUNTER — APPOINTMENT (OUTPATIENT)
Dept: PRIMARY CARE | Facility: CLINIC | Age: 29
End: 2024-08-02
Payer: COMMERCIAL

## 2024-08-02 VITALS
BODY MASS INDEX: 29.08 KG/M2 | DIASTOLIC BLOOD PRESSURE: 69 MMHG | WEIGHT: 214.4 LBS | HEART RATE: 87 BPM | OXYGEN SATURATION: 97 % | RESPIRATION RATE: 16 BRPM | SYSTOLIC BLOOD PRESSURE: 109 MMHG

## 2024-08-02 DIAGNOSIS — G44.309 POST-CONCUSSION HEADACHE: ICD-10-CM

## 2024-08-02 DIAGNOSIS — S06.0X0A CONCUSSION WITHOUT LOSS OF CONSCIOUSNESS, INITIAL ENCOUNTER: Primary | ICD-10-CM
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
## 2024-08-02 DIAGNOSIS — S16.1XXD STRAIN OF NECK MUSCLE, SUBSEQUENT ENCOUNTER: ICD-10-CM

## 2024-08-02 DIAGNOSIS — S29.019D THORACIC MYOFASCIAL STRAIN, SUBSEQUENT ENCOUNTER: ICD-10-CM

## 2024-08-02 DIAGNOSIS — R11.0 NAUSEA: ICD-10-CM

## 2024-08-02 PROCEDURE — 3078F DIAST BP <80 MM HG: CPT | Performed by: STUDENT IN AN ORGANIZED HEALTH CARE EDUCATION/TRAINING PROGRAM

## 2024-08-02 PROCEDURE — 99214 OFFICE O/P EST MOD 30 MIN: CPT | Performed by: STUDENT IN AN ORGANIZED HEALTH CARE EDUCATION/TRAINING PROGRAM

## 2024-08-02 PROCEDURE — 3074F SYST BP LT 130 MM HG: CPT | Performed by: STUDENT IN AN ORGANIZED HEALTH CARE EDUCATION/TRAINING PROGRAM

## 2024-08-02 RX ORDER — CYANOCOBALAMIN (VITAMIN B-12) 100 MCG
TABLET ORAL
COMMUNITY
Start: 2024-07-17

## 2024-08-02 RX ORDER — FLUOCINONIDE 0.5 MG/G
CREAM TOPICAL
COMMUNITY
Start: 2024-07-17

## 2024-08-02 ASSESSMENT — ENCOUNTER SYMPTOMS
PHOTOPHOBIA: 1
RHINORRHEA: 0
NUMBNESS: 0
HYPERACTIVE: 0
NAUSEA: 0
VOMITING: 0
FATIGUE: 1
ARTHRALGIAS: 0
SLEEP DISTURBANCE: 1
CONSTIPATION: 0
SHORTNESS OF BREATH: 0
HEADACHES: 1
NERVOUS/ANXIOUS: 1
DECREASED CONCENTRATION: 1
MYALGIAS: 1
DIZZINESS: 0
ABDOMINAL PAIN: 0
WEAKNESS: 0
LIGHT-HEADEDNESS: 0
FEVER: 0
CONFUSION: 0
DIARRHEA: 0
BACK PAIN: 1
CHILLS: 0
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
## 2024-08-02 NOTE — PATIENT INSTRUCTIONS
With your continued concussion symptoms, I am going to resubmit a couple of different referrals.    I will going to try to submit for a concussion clinic with [facility].  I do believe that a lot of their offices are going to be closer to [facility].  I at least want to see if they have openings somewhat soon to try to get you in because that would be a more comprehensive system.  We have tried referring through the [facility] system which was not able to be worked out.    I am also can to be submitting another order to a different neurologist in the area.  They are not specifically with [facility] but they may be able to get you in faster.  I am not 100% sure you are to the insurance but we will try faxing the order over to them.    I would like you to see the physical therapy downstairs here at this location.  I am not exactly sure what the other physical therapist has been working with you but it does not sound that they have been doing as much concussion therapy.  Her physical therapist downstairs usually will keep me pretty updated based on how their impressions are of you and a lot of times will send me additional updates if they feel like the therapy will not be of benefit and need further evaluation with neurology.    Please keep your scheduled MRI on 8/12/2024 for further evaluation.    Please let me know if you need any additional paperwork completed for work as I can complete that as well.    We will try to work on these referrals to try to get you additional help soon.    If you are not hearing back from our office by Tuesday next week, please give us a call back.    Thank you

## 2024-08-12 ENCOUNTER — HOSPITAL ENCOUNTER (OUTPATIENT)
Dept: RADIOLOGY | Facility: CLINIC | Age: 29
Discharge: HOME | End: 2024-08-12
Payer: COMMERCIAL

## 2024-08-12 DIAGNOSIS — S06.0X0A CONCUSSION WITHOUT LOSS OF CONSCIOUSNESS, INITIAL ENCOUNTER: ICD-10-CM

## 2024-08-12 DIAGNOSIS — R11.0 NAUSEA: ICD-10-CM

## 2024-08-12 DIAGNOSIS — G44.309 POST-CONCUSSION HEADACHE: ICD-10-CM
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
## 2024-08-12 PROCEDURE — 70551 MRI BRAIN STEM W/O DYE: CPT

## 2024-08-12 PROCEDURE — 70551 MRI BRAIN STEM W/O DYE: CPT | Performed by: RADIOLOGY

## 2024-08-26 ENCOUNTER — TELEPHONE (OUTPATIENT)
Dept: PRIMARY CARE | Facility: CLINIC | Age: 29
End: 2024-08-26
Payer: COMMERCIAL

## 2024-08-26 NOTE — TELEPHONE ENCOUNTER
Patient called in the office to speak with you, he needs to discuss his up coming neurology appt.  canceled the appt with the  provider wanted to schedule him with someone else which was not available until Feb. patient would like to know if at this point should just keep the neurology appt the end of September with a local doctor in [place] if so he needs the name and phone number again for that provider.      Pt also has questions about his physical therapy and would like to ask for new paperwork for his employer for the month of September.    Patient asking for a return call from you to discuss, he is available after 10:00 am today. [phone]

## 2024-08-26 NOTE — TELEPHONE ENCOUNTER
Appointment Cancelled for Patient. Per Dr. Doretha Newberry, we are cancelling this pt's appt with her for a concussion.  She feels that the pt should be seeing someone with more specialty for this diagnosis.  We have tried to find another provider for this pt, but due to their location being 40 mins or more for the pt to drive, he said that he could not schedule these options at it is difficult for him to drive that far. Please reach out to your pt and see if there is another option.  He did say that he has a Neurology appt late September.

## 2024-08-27 ENCOUNTER — APPOINTMENT (OUTPATIENT)
Dept: PRIMARY CARE | Facility: CLINIC | Age: 29
End: 2024-08-27
Payer: COMMERCIAL

## 2024-09-05 ENCOUNTER — APPOINTMENT (OUTPATIENT)
Dept: PRIMARY CARE | Facility: CLINIC | Age: 29
End: 2024-09-05
Payer: COMMERCIAL

## 2024-09-05 ENCOUNTER — LAB (OUTPATIENT)
Dept: LAB | Facility: LAB | Age: 29
End: 2024-09-05
Payer: COMMERCIAL

## 2024-09-05 VITALS
OXYGEN SATURATION: 98 % | TEMPERATURE: 97.3 F | DIASTOLIC BLOOD PRESSURE: 79 MMHG | SYSTOLIC BLOOD PRESSURE: 124 MMHG | HEART RATE: 82 BPM | WEIGHT: 213 LBS | BODY MASS INDEX: 28.89 KG/M2

## 2024-09-05 DIAGNOSIS — G44.309 POST-CONCUSSION HEADACHE: ICD-10-CM

## 2024-09-05 DIAGNOSIS — S06.0X0A CONCUSSION WITHOUT LOSS OF CONSCIOUSNESS, INITIAL ENCOUNTER: Primary | ICD-10-CM
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
## 2024-09-05 DIAGNOSIS — R73.01 ELEVATED FASTING GLUCOSE: ICD-10-CM

## 2024-09-05 DIAGNOSIS — D58.2 ELEVATED HEMOGLOBIN (CMS-HCC): ICD-10-CM

## 2024-09-05 DIAGNOSIS — R79.9 ELEVATED BLOOD UREA NITROGEN: ICD-10-CM

## 2024-09-05 DIAGNOSIS — E78.00 ELEVATED LDL CHOLESTEROL LEVEL: ICD-10-CM

## 2024-09-05 DIAGNOSIS — R79.89 ELEVATED SERUM CREATININE: ICD-10-CM

## 2024-09-05 DIAGNOSIS — R07.89 CHEST TIGHTNESS: ICD-10-CM

## 2024-09-05 DIAGNOSIS — S29.019D THORACIC MYOFASCIAL STRAIN, SUBSEQUENT ENCOUNTER: ICD-10-CM

## 2024-09-05 DIAGNOSIS — S16.1XXD STRAIN OF NECK MUSCLE, SUBSEQUENT ENCOUNTER: ICD-10-CM

## 2024-09-05 DIAGNOSIS — G47.9 TROUBLE IN SLEEPING: ICD-10-CM

## 2024-09-05 LAB
ANION GAP SERPL CALC-SCNC: 12 MMOL/L (ref 10–20)
BUN SERPL-MCNC: 31 MG/DL (ref 6–23)
CALCIUM SERPL-MCNC: 10.1 MG/DL (ref 8.6–10.6)
CHLORIDE SERPL-SCNC: 103 MMOL/L (ref 98–107)
CO2 SERPL-SCNC: 28 MMOL/L (ref 21–32)
CREAT SERPL-MCNC: 1.19 MG/DL (ref 0.5–1.3)
EGFRCR SERPLBLD CKD-EPI 2021: 84 ML/MIN/1.73M*2
ERYTHROCYTE [DISTWIDTH] IN BLOOD BY AUTOMATED COUNT: 12 % (ref 11.5–14.5)
EST. AVERAGE GLUCOSE BLD GHB EST-MCNC: 100 MG/DL
GLUCOSE SERPL-MCNC: 96 MG/DL (ref 74–99)
HBA1C MFR BLD: 5.1 %
HCT VFR BLD AUTO: 54 % (ref 41–52)
HGB BLD-MCNC: 17.8 G/DL (ref 13.5–17.5)
MCH RBC QN AUTO: 30.1 PG (ref 26–34)
MCHC RBC AUTO-ENTMCNC: 33 G/DL (ref 32–36)
MCV RBC AUTO: 91 FL (ref 80–100)
NRBC BLD-RTO: 0 /100 WBCS (ref 0–0)
PLATELET # BLD AUTO: 184 X10*3/UL (ref 150–450)
POTASSIUM SERPL-SCNC: 5 MMOL/L (ref 3.5–5.3)
RBC # BLD AUTO: 5.91 X10*6/UL (ref 4.5–5.9)
SODIUM SERPL-SCNC: 138 MMOL/L (ref 136–145)
WBC # BLD AUTO: 6.9 X10*3/UL (ref 4.4–11.3)

## 2024-09-05 PROCEDURE — 80048 BASIC METABOLIC PNL TOTAL CA: CPT

## 2024-09-05 PROCEDURE — 82610 CYSTATIN C: CPT

## 2024-09-05 PROCEDURE — 3074F SYST BP LT 130 MM HG: CPT | Performed by: STUDENT IN AN ORGANIZED HEALTH CARE EDUCATION/TRAINING PROGRAM

## 2024-09-05 PROCEDURE — 36415 COLL VENOUS BLD VENIPUNCTURE: CPT

## 2024-09-05 PROCEDURE — 93000 ELECTROCARDIOGRAM COMPLETE: CPT | Performed by: STUDENT IN AN ORGANIZED HEALTH CARE EDUCATION/TRAINING PROGRAM

## 2024-09-05 PROCEDURE — 99213 OFFICE O/P EST LOW 20 MIN: CPT | Performed by: STUDENT IN AN ORGANIZED HEALTH CARE EDUCATION/TRAINING PROGRAM

## 2024-09-05 PROCEDURE — 83036 HEMOGLOBIN GLYCOSYLATED A1C: CPT

## 2024-09-05 PROCEDURE — 3078F DIAST BP <80 MM HG: CPT | Performed by: STUDENT IN AN ORGANIZED HEALTH CARE EDUCATION/TRAINING PROGRAM

## 2024-09-05 PROCEDURE — 85027 COMPLETE CBC AUTOMATED: CPT

## 2024-09-05 PROCEDURE — 83695 ASSAY OF LIPOPROTEIN(A): CPT

## 2024-09-05 RX ORDER — TRAZODONE HYDROCHLORIDE 50 MG/1
50 TABLET ORAL NIGHTLY PRN
Qty: 10 TABLET | Refills: 0 | Status: SHIPPED | OUTPATIENT
Start: 2024-09-05 | End: 2025-09-05

## 2024-09-05 ASSESSMENT — ENCOUNTER SYMPTOMS
VOMITING: 0
DIZZINESS: 0
ARTHRALGIAS: 0
WEAKNESS: 0
PHOTOPHOBIA: 1
HYPERACTIVE: 0
NAUSEA: 0
CONSTIPATION: 0
RHINORRHEA: 0
CONFUSION: 0
DECREASED CONCENTRATION: 1
NUMBNESS: 0
CHILLS: 0
SLEEP DISTURBANCE: 1
FATIGUE: 1
DIARRHEA: 0
NERVOUS/ANXIOUS: 1
SHORTNESS OF BREATH: 0
MYALGIAS: 0
LIGHT-HEADEDNESS: 0
FEVER: 0
HEADACHES: 1
BACK PAIN: 1
ABDOMINAL PAIN: 0
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
## 2024-09-07 LAB
APO B100 SERPL-MCNC: 123 MG/DL (ref 66–133)
CYSTATIN C SERPL-MCNC: 1 MG/L (ref 0.5–1.2)
GFR/BSA.PRED SERPLBLD CYS-BASED-ARV: 87 ML/MIN/BSA

## 2024-09-23 ENCOUNTER — APPOINTMENT (OUTPATIENT)
Dept: PRIMARY CARE | Facility: CLINIC | Age: 29
End: 2024-09-23
Payer: COMMERCIAL

## 2024-09-23 VITALS
OXYGEN SATURATION: 97 % | HEART RATE: 98 BPM | BODY MASS INDEX: 29.16 KG/M2 | SYSTOLIC BLOOD PRESSURE: 128 MMHG | WEIGHT: 215 LBS | TEMPERATURE: 96.8 F | DIASTOLIC BLOOD PRESSURE: 77 MMHG

## 2024-09-23 DIAGNOSIS — S06.0X0A CONCUSSION WITHOUT LOSS OF CONSCIOUSNESS, INITIAL ENCOUNTER: Primary | ICD-10-CM
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
## 2024-09-23 DIAGNOSIS — G44.309 POST-CONCUSSION HEADACHE: ICD-10-CM

## 2024-09-23 DIAGNOSIS — S16.1XXD STRAIN OF NECK MUSCLE, SUBSEQUENT ENCOUNTER: ICD-10-CM

## 2024-09-23 DIAGNOSIS — S29.019D THORACIC MYOFASCIAL STRAIN, SUBSEQUENT ENCOUNTER: ICD-10-CM

## 2024-09-23 PROCEDURE — 3074F SYST BP LT 130 MM HG: CPT | Performed by: STUDENT IN AN ORGANIZED HEALTH CARE EDUCATION/TRAINING PROGRAM

## 2024-09-23 PROCEDURE — 99213 OFFICE O/P EST LOW 20 MIN: CPT | Performed by: STUDENT IN AN ORGANIZED HEALTH CARE EDUCATION/TRAINING PROGRAM

## 2024-09-23 PROCEDURE — 3078F DIAST BP <80 MM HG: CPT | Performed by: STUDENT IN AN ORGANIZED HEALTH CARE EDUCATION/TRAINING PROGRAM

## 2024-09-23 RX ORDER — AMITRIPTYLINE HYDROCHLORIDE 25 MG/1
25 TABLET, FILM COATED ORAL NIGHTLY
COMMUNITY
Start: 2024-09-18 | End: 2024-12-17

## 2024-09-23 ASSESSMENT — ENCOUNTER SYMPTOMS
SLEEP DISTURBANCE: 1
CHILLS: 0
NAUSEA: 0
CONFUSION: 0
DIARRHEA: 0
ARTHRALGIAS: 0
FATIGUE: 1
HEADACHES: 1
FEVER: 0
VOMITING: 0
NERVOUS/ANXIOUS: 1
BACK PAIN: 1
HYPERACTIVE: 0
LIGHT-HEADEDNESS: 0
ABDOMINAL PAIN: 0
DIZZINESS: 0
RHINORRHEA: 0
SHORTNESS OF BREATH: 0
WEAKNESS: 0
MYALGIAS: 0
DECREASED CONCENTRATION: 1
PHOTOPHOBIA: 1
NUMBNESS: 0
CONSTIPATION: 0
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
## 2024-10-06 DIAGNOSIS — R05.1 ACUTE COUGH: ICD-10-CM

## 2024-10-06 DIAGNOSIS — K21.9 GASTROESOPHAGEAL REFLUX DISEASE, UNSPECIFIED WHETHER ESOPHAGITIS PRESENT: ICD-10-CM

## 2024-10-07 RX ORDER — CETIRIZINE HYDROCHLORIDE 10 MG/1
10 TABLET ORAL DAILY
Qty: 90 TABLET | Refills: 0 | Status: SHIPPED | OUTPATIENT
Start: 2024-10-07
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
## 2024-10-22 ENCOUNTER — APPOINTMENT (OUTPATIENT)
Dept: PRIMARY CARE | Facility: CLINIC | Age: 29
End: 2024-10-22
Payer: COMMERCIAL

## 2024-10-22 VITALS
HEIGHT: 72 IN | WEIGHT: 215 LBS | DIASTOLIC BLOOD PRESSURE: 76 MMHG | HEART RATE: 104 BPM | OXYGEN SATURATION: 98 % | BODY MASS INDEX: 29.12 KG/M2 | SYSTOLIC BLOOD PRESSURE: 108 MMHG | RESPIRATION RATE: 18 BRPM

## 2024-10-22 DIAGNOSIS — R16.1 SPLENOMEGALY: ICD-10-CM

## 2024-10-22 DIAGNOSIS — G44.309 POST-CONCUSSION HEADACHE: ICD-10-CM

## 2024-10-22 DIAGNOSIS — S29.019D THORACIC MYOFASCIAL STRAIN, SUBSEQUENT ENCOUNTER: ICD-10-CM

## 2024-10-22 DIAGNOSIS — G47.9 TROUBLE IN SLEEPING: ICD-10-CM

## 2024-10-22 DIAGNOSIS — S16.1XXD STRAIN OF NECK MUSCLE, SUBSEQUENT ENCOUNTER: ICD-10-CM

## 2024-10-22 DIAGNOSIS — S06.0X0A CONCUSSION WITHOUT LOSS OF CONSCIOUSNESS, INITIAL ENCOUNTER: Primary | ICD-10-CM

## 2024-10-22 PROBLEM — Z20.822 EXPOSURE TO SEVERE ACUTE RESPIRATORY SYNDROME CORONAVIRUS 2 (SARS-COV-2): Status: ACTIVE | Noted: 2024-10-22

## 2024-10-22 PROBLEM — F51.02 ADJUSTMENT INSOMNIA: Status: ACTIVE | Noted: 2024-09-18

## 2024-10-22 PROBLEM — R09.89: Status: ACTIVE | Noted: 2024-10-22

## 2024-10-22 PROBLEM — F41.9 ANXIETY: Status: ACTIVE | Noted: 2024-09-18

## 2024-10-22 PROBLEM — N50.811 PAIN IN RIGHT TESTICLE: Status: ACTIVE | Noted: 2023-06-21

## 2024-10-22 PROBLEM — J10.1 INFLUENZA DUE TO INFLUENZA A VIRUS: Status: ACTIVE | Noted: 2024-10-22

## 2024-10-22 PROBLEM — M79.669 CALF PAIN: Status: ACTIVE | Noted: 2024-10-22

## 2024-10-22 PROBLEM — B00.1 RECURRENT HERPES LABIALIS: Status: ACTIVE | Noted: 2024-10-22

## 2024-10-22 PROBLEM — N18.2 STAGE 2 CHRONIC KIDNEY DISEASE: Status: ACTIVE | Noted: 2024-10-22

## 2024-10-22 PROBLEM — F45.8 BRUXISM: Status: ACTIVE | Noted: 2024-10-22

## 2024-10-22 PROBLEM — L30.9 DERMATITIS, UNSPECIFIED: Status: ACTIVE | Noted: 2019-12-02

## 2024-10-22 PROBLEM — G43.719 CHRONIC MIGRAINE WITHOUT AURA, INTRACTABLE, WITHOUT STATUS MIGRAINOSUS: Status: ACTIVE | Noted: 2024-09-18

## 2024-10-22 PROBLEM — R06.83 SNORING: Status: ACTIVE | Noted: 2024-10-22

## 2024-10-22 PROBLEM — H69.90 DYSFUNCTION OF EUSTACHIAN TUBE: Status: ACTIVE | Noted: 2024-10-22

## 2024-10-22 PROBLEM — F07.81 POST CONCUSSION SYNDROME: Status: ACTIVE | Noted: 2024-09-18

## 2024-10-22 PROBLEM — J34.3 HYPERTROPHY OF NASAL TURBINATES: Status: ACTIVE | Noted: 2024-10-22

## 2024-10-22 PROBLEM — R30.0 DYSURIA: Status: ACTIVE | Noted: 2023-06-21

## 2024-10-22 PROBLEM — R40.0 DAYTIME SOMNOLENCE: Status: ACTIVE | Noted: 2024-10-22

## 2024-10-22 PROCEDURE — 3008F BODY MASS INDEX DOCD: CPT | Performed by: STUDENT IN AN ORGANIZED HEALTH CARE EDUCATION/TRAINING PROGRAM

## 2024-10-22 PROCEDURE — 3074F SYST BP LT 130 MM HG: CPT | Performed by: STUDENT IN AN ORGANIZED HEALTH CARE EDUCATION/TRAINING PROGRAM

## 2024-10-22 PROCEDURE — 3078F DIAST BP <80 MM HG: CPT | Performed by: STUDENT IN AN ORGANIZED HEALTH CARE EDUCATION/TRAINING PROGRAM

## 2024-10-22 PROCEDURE — 99213 OFFICE O/P EST LOW 20 MIN: CPT | Performed by: STUDENT IN AN ORGANIZED HEALTH CARE EDUCATION/TRAINING PROGRAM

## 2024-10-22 RX ORDER — TOPIRAMATE 25 MG/1
25 TABLET ORAL 2 TIMES DAILY
COMMUNITY
Start: 2024-10-17 | End: 2025-10-17

## 2024-10-22 RX ORDER — TADALAFIL 5 MG/1
1 TABLET ORAL
COMMUNITY
Start: 2024-09-23

## 2024-10-22 RX ORDER — SUMATRIPTAN SUCCINATE 100 MG/1
100 TABLET ORAL ONCE AS NEEDED
COMMUNITY
Start: 2024-10-17 | End: 2024-11-16

## 2024-10-28 ASSESSMENT — ENCOUNTER SYMPTOMS
ABDOMINAL PAIN: 0
CHILLS: 0
MYALGIAS: 0
NERVOUS/ANXIOUS: 1
ARTHRALGIAS: 0
CONFUSION: 0
NUMBNESS: 0
CONSTIPATION: 0
RHINORRHEA: 0
FATIGUE: 0
HYPERACTIVE: 0
WEAKNESS: 0
HEADACHES: 1
VOMITING: 0
DIARRHEA: 0
DIZZINESS: 0
SHORTNESS OF BREATH: 0
NAUSEA: 0
BACK PAIN: 1
PHOTOPHOBIA: 1
DECREASED CONCENTRATION: 1
FEVER: 0
SLEEP DISTURBANCE: 1
LIGHT-HEADEDNESS: 0

## 2024-11-29 ENCOUNTER — APPOINTMENT (OUTPATIENT)
Dept: PRIMARY CARE | Facility: CLINIC | Age: 29
End: 2024-11-29
Payer: COMMERCIAL

## 2024-11-29 DIAGNOSIS — S06.0X0D CONCUSSION WITHOUT LOSS OF CONSCIOUSNESS, SUBSEQUENT ENCOUNTER: Primary | ICD-10-CM

## 2024-11-29 DIAGNOSIS — S29.019D THORACIC MYOFASCIAL STRAIN, SUBSEQUENT ENCOUNTER: ICD-10-CM

## 2024-11-29 DIAGNOSIS — S16.1XXD STRAIN OF NECK MUSCLE, SUBSEQUENT ENCOUNTER: ICD-10-CM

## 2024-11-29 DIAGNOSIS — G44.309 POST-CONCUSSION HEADACHE: ICD-10-CM

## 2024-11-29 DIAGNOSIS — G47.9 TROUBLE IN SLEEPING: ICD-10-CM

## 2024-11-29 PROCEDURE — 99213 OFFICE O/P EST LOW 20 MIN: CPT | Performed by: STUDENT IN AN ORGANIZED HEALTH CARE EDUCATION/TRAINING PROGRAM
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
## 2024-12-06 ASSESSMENT — ENCOUNTER SYMPTOMS
BACK PAIN: 1
VOMITING: 0
CONFUSION: 0
LIGHT-HEADEDNESS: 0
FEVER: 0
ABDOMINAL PAIN: 0
DIZZINESS: 0
NERVOUS/ANXIOUS: 1
HYPERACTIVE: 0
SHORTNESS OF BREATH: 0
MYALGIAS: 0
NUMBNESS: 0
SLEEP DISTURBANCE: 1
PHOTOPHOBIA: 1
DECREASED CONCENTRATION: 1
WEAKNESS: 0
NAUSEA: 0
CHILLS: 0
RHINORRHEA: 0
FATIGUE: 0
ARTHRALGIAS: 0
CONSTIPATION: 0
HEADACHES: 1
DIARRHEA: 0

## 2024-12-08 NOTE — PATIENT INSTRUCTIONS
Thank you for coming in for follow-up open.  We did discuss that it seems you have been making more improvement over this last month.  We did discuss continued symptoms with trouble sleeping along with continued headaches.  Recommended continued follow-up with neurology with your next appointment being on 12/2/2024.  Recommended continuing on the medications that they have you on for now and see if there is anything else that you would like to adjust.  I do recommend continuing with overall healthy diet and we did discuss some additional supplementation to continue to aid with recovery.  It sounds like they are having you start back at work on 12/2/2024 and that you have a call with your HR department later today.  I do me agree with having you work out of the [place] office as that would minimize the buildup of symptoms due to a longer drive, putting you at a partially incapacitated state even before arriving to work.  I do want to make sure that you are getting reacclimated at a slower pace so that you do not regress on the progress that you have made so far.  If they are not understanding of this, then we can make more specific accommodations depending on how you are doing.  This might also change over time as you will be in an environment at work different from at home where you have been currently recovering.  We can plan on following up in the next month.  Thank you
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
## 2024-12-23 ENCOUNTER — APPOINTMENT (OUTPATIENT)
Dept: PRIMARY CARE | Facility: CLINIC | Age: 29
End: 2024-12-23
Payer: COMMERCIAL

## 2024-12-23 DIAGNOSIS — S06.0X0D CONCUSSION WITHOUT LOSS OF CONSCIOUSNESS, SUBSEQUENT ENCOUNTER: Primary | ICD-10-CM

## 2024-12-23 DIAGNOSIS — S29.019D THORACIC MYOFASCIAL STRAIN, SUBSEQUENT ENCOUNTER: ICD-10-CM

## 2024-12-23 DIAGNOSIS — G44.309 POST-CONCUSSION HEADACHE: ICD-10-CM

## 2024-12-23 DIAGNOSIS — S16.1XXD STRAIN OF NECK MUSCLE, SUBSEQUENT ENCOUNTER: ICD-10-CM

## 2024-12-23 DIAGNOSIS — G47.00 INSOMNIA, UNSPECIFIED TYPE: ICD-10-CM

## 2024-12-23 PROCEDURE — 99213 OFFICE O/P EST LOW 20 MIN: CPT | Performed by: STUDENT IN AN ORGANIZED HEALTH CARE EDUCATION/TRAINING PROGRAM

## 2024-12-23 ASSESSMENT — ENCOUNTER SYMPTOMS
CONSTIPATION: 0
VOMITING: 0
DIZZINESS: 0
DIARRHEA: 0
ARTHRALGIAS: 0
BACK PAIN: 1
RHINORRHEA: 0
SHORTNESS OF BREATH: 0
NERVOUS/ANXIOUS: 1
NAUSEA: 0
ABDOMINAL PAIN: 0
NECK PAIN: 1
LIGHT-HEADEDNESS: 0
FEVER: 0
WEAKNESS: 0
SLEEP DISTURBANCE: 1
PHOTOPHOBIA: 1
DECREASED CONCENTRATION: 1
MYALGIAS: 0
FATIGUE: 0
HYPERACTIVE: 0
CONFUSION: 0
HEADACHES: 1
NUMBNESS: 0
CHILLS: 0
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
## 2025-03-28 ENCOUNTER — APPOINTMENT (OUTPATIENT)
Dept: PRIMARY CARE | Facility: CLINIC | Age: 30
End: 2025-03-28
Payer: COMMERCIAL

## 2025-04-10 ENCOUNTER — OFFICE VISIT (OUTPATIENT)
Dept: URGENT CARE | Age: 30
End: 2025-04-10
Payer: COMMERCIAL

## 2025-04-10 VITALS
TEMPERATURE: 97.3 F | DIASTOLIC BLOOD PRESSURE: 68 MMHG | OXYGEN SATURATION: 98 % | SYSTOLIC BLOOD PRESSURE: 138 MMHG | HEART RATE: 90 BPM | RESPIRATION RATE: 16 BRPM

## 2025-04-10 DIAGNOSIS — R07.9 CHEST PAIN, UNSPECIFIED TYPE: Primary | ICD-10-CM

## 2025-04-10 ASSESSMENT — ENCOUNTER SYMPTOMS
CONSTITUTIONAL NEGATIVE: 1
EYES NEGATIVE: 1
RESPIRATORY NEGATIVE: 1

## 2025-04-10 NOTE — PROGRESS NOTES
Subjective   Patient ID: Tien Salcedo is a 30 y.o. male. They present today with a chief complaint of Chest Pain (L sided chest pain x 3 days, lightheadedness, nausea began today).    History of Present Illness  A 30-year-old male arrives to clinic with chief complaint of left-sided chest pain.  The patient reports having the symptoms over the last couple of months however has increased in severity and duration over the last 3 days.  He does report slight nausea and lightheadedness at times.  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  He is also a full-time student which understands that stress and anxiety can cause some chest pain.  However he also understands that this can be a significant cardiovascular event.  He denies any shortness of breath, dizziness, abdominal pain.  He is here for evaluation.  Chest Pain      Past Medical History  Allergies as of 04/10/2025 - Reviewed 04/10/2025   Allergen Reaction Noted    Doxycycline Diarrhea 05/23/2023       (Not in a hospital admission)       Past Medical History:   Diagnosis Date    Chronic kidney disease, stage 2 (mild) 03/28/2022    Stage 2 chronic kidney disease    Other allergy status, other than to drugs and biological substances 09/18/2019    History of environmental allergies    Other allergy status, other than to drugs and biological substances 05/13/2020    History of environmental allergies    Personal history of other diseases of the respiratory system 10/11/2017    History of acute pharyngitis    Personal history of other specified conditions 10/30/2018    History of bruising easily    Pleurodynia 05/03/2017    Chest pain, pleuritic       History reviewed. No pertinent surgical history.     reports that he has never smoked. He has never been exposed to tobacco smoke. He has never used smokeless tobacco. He reports current alcohol use. He reports that he does not use drugs.    Review of Systems  Review of  Systems   Constitutional: Negative.    HENT: Negative.     Eyes: Negative.    Respiratory: Negative.     Cardiovascular:  Positive for chest pain.       Objective    Vitals:    04/10/25 1844   BP: 138/68   Pulse: 90   Resp: 16   Temp: 36.3 °C (97.3 °F)   SpO2: 98%     No LMP for male patient.    Physical Exam  Vitals and nursing note reviewed.   Constitutional:       Appearance: Normal appearance.   HENT:      Head: Normocephalic and atraumatic.      Right Ear: Tympanic membrane normal.      Left Ear: Tympanic membrane normal.      Nose: Nose normal.      Mouth/Throat:      Mouth: Mucous membranes are moist.      Pharynx: Oropharynx is clear.   Eyes:      Extraocular Movements: Extraocular movements intact.      Conjunctiva/sclera: Conjunctivae normal.      Pupils: Pupils are equal, round, and reactive to light.   Cardiovascular:      Rate and Rhythm: Normal rate and regular rhythm.   Pulmonary:      Effort: Pulmonary effort is normal.      Breath sounds: Normal breath sounds.   Abdominal:      General: Bowel sounds are normal.      Palpations: Abdomen is soft.   Musculoskeletal:         General: Normal range of motion.      Cervical back: Normal range of motion and neck supple.   Skin:     General: Skin is warm.      Capillary Refill: Capillary refill takes less than 2 seconds.   Neurological:      General: No focal deficit present.      Mental Status: He is alert and oriented to person, place, and time. Mental status is at baseline.   Psychiatric:         Mood and Affect: Mood normal.         Behavior: Behavior normal.         Thought Content: Thought content normal.         Judgment: Judgment normal.         Procedures    Point of Care Test & Imaging Results from this visit  No results found for this visit on 04/10/25.   Imaging  No results found.    Cardiology, Vascular, and Other Imaging  No other imaging results found for the past 2 days      Diagnostic study results (if any) were reviewed by Raman Kennedy  APRN-CNP.    Assessment/Plan   Allergies, medications, history, and pertinent labs/EKGs/Imaging reviewed by ELIZABETH Martin.     Medical Decision Making  EKG completed in office today revealed normal sinus rhythm with no ST elevation, ectopy or depression.  Given your symptoms of chest pain, I recommend that you had to the emergency department for further evaluation and health maintenance.    Orders and Diagnoses  Diagnoses and all orders for this visit:  Chest pain, unspecified type  -     ECG 12 lead (Clinic Performed)      Medical Admin Record      Patient disposition: ED    Electronically signed by ELIZABETH Martin  6:58 PM

## 2025-04-11 ENCOUNTER — OFFICE VISIT (OUTPATIENT)
Dept: PRIMARY CARE | Facility: CLINIC | Age: 30
End: 2025-04-11
Payer: COMMERCIAL

## 2025-04-11 VITALS
HEART RATE: 91 BPM | WEIGHT: 213 LBS | SYSTOLIC BLOOD PRESSURE: 120 MMHG | BODY MASS INDEX: 28.89 KG/M2 | DIASTOLIC BLOOD PRESSURE: 86 MMHG | TEMPERATURE: 98.5 F | OXYGEN SATURATION: 96 %

## 2025-04-11 DIAGNOSIS — K13.79 BLEEDING IN MOUTH: ICD-10-CM

## 2025-04-11 DIAGNOSIS — K21.9 GASTROESOPHAGEAL REFLUX DISEASE, UNSPECIFIED WHETHER ESOPHAGITIS PRESENT: Primary | ICD-10-CM

## 2025-04-11 DIAGNOSIS — R60.0 SWELLING OF SUBLINGUAL GLAND: ICD-10-CM

## 2025-04-11 LAB
ALBUMIN SERPL-MCNC: 4.7 G/DL (ref 3.6–5.1)
ALP SERPL-CCNC: 54 U/L (ref 36–130)
ALT SERPL-CCNC: 29 U/L (ref 9–46)
ANION GAP SERPL CALCULATED.4IONS-SCNC: 11 MMOL/L (CALC) (ref 7–17)
AST SERPL-CCNC: 18 U/L (ref 10–40)
BASOPHILS # BLD AUTO: 41 CELLS/UL (ref 0–200)
BASOPHILS NFR BLD AUTO: 0.5 %
BILIRUB SERPL-MCNC: 0.5 MG/DL (ref 0.2–1.2)
BUN SERPL-MCNC: 26 MG/DL (ref 7–25)
CALCIUM SERPL-MCNC: 9.6 MG/DL (ref 8.6–10.3)
CHLORIDE SERPL-SCNC: 103 MMOL/L (ref 98–110)
CO2 SERPL-SCNC: 29 MMOL/L (ref 20–32)
CREAT SERPL-MCNC: 1.07 MG/DL (ref 0.6–1.26)
EGFRCR SERPLBLD CKD-EPI 2021: 95 ML/MIN/1.73M2
EOSINOPHIL # BLD AUTO: 97 CELLS/UL (ref 15–500)
EOSINOPHIL NFR BLD AUTO: 1.2 %
ERYTHROCYTE [DISTWIDTH] IN BLOOD BY AUTOMATED COUNT: 12.5 % (ref 11–15)
GLUCOSE SERPL-MCNC: 94 MG/DL (ref 65–99)
HCT VFR BLD AUTO: 49.3 % (ref 38.5–50)
HGB BLD-MCNC: 16.9 G/DL (ref 13.2–17.1)
LYMPHOCYTES # BLD AUTO: 1539 CELLS/UL (ref 850–3900)
LYMPHOCYTES NFR BLD AUTO: 19 %
MCH RBC QN AUTO: 30.6 PG (ref 27–33)
MCHC RBC AUTO-ENTMCNC: 34.3 G/DL (ref 32–36)
MCV RBC AUTO: 89.3 FL (ref 80–100)
MONOCYTES # BLD AUTO: 599 CELLS/UL (ref 200–950)
MONOCYTES NFR BLD AUTO: 7.4 %
NEUTROPHILS # BLD AUTO: 5824 CELLS/UL (ref 1500–7800)
NEUTROPHILS NFR BLD AUTO: 71.9 %
PLATELET # BLD AUTO: 198 THOUSAND/UL (ref 140–400)
PMV BLD REES-ECKER: 11.8 FL (ref 7.5–12.5)
POTASSIUM SERPL-SCNC: 4.2 MMOL/L (ref 3.5–5.3)
PROT SERPL-MCNC: 7.4 G/DL (ref 6.1–8.1)
RBC # BLD AUTO: 5.52 MILLION/UL (ref 4.2–5.8)
SODIUM SERPL-SCNC: 143 MMOL/L (ref 135–146)
WBC # BLD AUTO: 8.1 THOUSAND/UL (ref 3.8–10.8)

## 2025-04-11 PROCEDURE — 3074F SYST BP LT 130 MM HG: CPT | Performed by: STUDENT IN AN ORGANIZED HEALTH CARE EDUCATION/TRAINING PROGRAM

## 2025-04-11 PROCEDURE — 99213 OFFICE O/P EST LOW 20 MIN: CPT | Performed by: STUDENT IN AN ORGANIZED HEALTH CARE EDUCATION/TRAINING PROGRAM

## 2025-04-11 PROCEDURE — 3079F DIAST BP 80-89 MM HG: CPT | Performed by: STUDENT IN AN ORGANIZED HEALTH CARE EDUCATION/TRAINING PROGRAM

## 2025-04-11 ASSESSMENT — ENCOUNTER SYMPTOMS
FATIGUE: 0
CHILLS: 0
NAUSEA: 0
LIGHT-HEADEDNESS: 0
RHINORRHEA: 0
DIZZINESS: 0
DIARRHEA: 0
CONSTIPATION: 0
ARTHRALGIAS: 0
FEVER: 0
COUGH: 0
ABDOMINAL PAIN: 0
VOMITING: 0
SHORTNESS OF BREATH: 0
HEADACHES: 0
MYALGIAS: 0

## 2025-04-11 NOTE — PATIENT INSTRUCTIONS
I do think that this is coming from more in your mouth especially since this happened while you are in the office with me.  I cannot pinpoint any particular location that might be leading to some of this blood-tinged sputum.  You are not having any sort of cough or other reflux symptoms actively that would be causing this as it does not seem to be happening after coughing episodes or anything for that matter.  I do want to start with some basic blood testing.  I am also going to place a referral to ENT.  Please let me know if you run into any issues getting scheduled with the ENT physician or if they are scheduling out a ways, we can always consider a different physician for follow-up to rule out of any particular causes.    We can hold off on routine follow-up at this time until we are able to figure out what is going on.    Please call with any other questions or concerns.    Thank you

## 2025-04-11 NOTE — PROGRESS NOTES
Subjective   Patient ID: Tien Salcedo is a 30 y.o. male who presents for Follow-up (Also has been spiting blood, possibly from GERD).    HPI     He has had 3 episodes over the last month.  He had noticed while getting ready for work he had some blood in his mouth and needed to spit.  This has happened a few more times as well.  He has not noticed anything in the mouth or from the teeth that would be causing bleeding.  He has not really had pain in his face or on the gum line.  He has not bitten his tongue.  He has not had any blood with coughing or any other symptoms.  He does have a chronic history of GERD. Last upper endoscopy 11/5/2020. He did try the PPI again and noticed his salivary gland production was a lot more.    Review of Systems   Constitutional:  Negative for chills, fatigue and fever.   HENT:  Negative for congestion and rhinorrhea.    Respiratory:  Negative for cough and shortness of breath.    Cardiovascular:  Negative for chest pain.   Gastrointestinal:  Negative for abdominal pain, constipation, diarrhea, nausea and vomiting.   Musculoskeletal:  Negative for arthralgias and myalgias.   Neurological:  Negative for dizziness, light-headedness and headaches.       Objective   /86   Pulse 91   Temp 36.9 °C (98.5 °F)   Wt 96.6 kg (213 lb)   SpO2 96%   BMI 28.89 kg/m²     Physical Exam  Vitals and nursing note reviewed.   Constitutional:       General: He is not in acute distress.     Appearance: Normal appearance. He is normal weight. He is not ill-appearing or toxic-appearing.   HENT:      Head: Normocephalic and atraumatic.   Cardiovascular:      Rate and Rhythm: Normal rate and regular rhythm.      Heart sounds: Normal heart sounds.   Pulmonary:      Effort: Pulmonary effort is normal.      Breath sounds: Normal breath sounds.   Abdominal:      General: Bowel sounds are normal.      Palpations: Abdomen is soft.   Neurological:      Mental Status: He is alert.         Assessment/Plan    Problem List Items Addressed This Visit             ICD-10-CM    GERD (gastroesophageal reflux disease) - Primary K21.9     Chronic. Was on pantoprazole in the past. Last upper endoscopy 11/5/2020.          Other Visit Diagnoses         Codes    Bleeding in mouth     K13.79    Relevant Orders    CBC and Auto Differential    Comprehensive Metabolic Panel    Swelling of sublingual gland     R60.0    Relevant Orders    CBC and Auto Differential    Comprehensive Metabolic Panel

## 2025-04-17 ENCOUNTER — APPOINTMENT (OUTPATIENT)
Dept: PRIMARY CARE | Facility: CLINIC | Age: 30
End: 2025-04-17
Payer: COMMERCIAL

## 2025-04-22 ENCOUNTER — APPOINTMENT (OUTPATIENT)
Dept: OTOLARYNGOLOGY | Facility: CLINIC | Age: 30
End: 2025-04-22
Payer: COMMERCIAL

## 2025-04-22 DIAGNOSIS — K13.79 BLEEDING IN MOUTH: ICD-10-CM

## 2025-04-22 DIAGNOSIS — R60.0 SWELLING OF SUBLINGUAL GLAND: ICD-10-CM

## 2025-04-22 PROCEDURE — 99203 OFFICE O/P NEW LOW 30 MIN: CPT | Performed by: PHYSICIAN ASSISTANT

## 2025-04-22 PROCEDURE — 1036F TOBACCO NON-USER: CPT | Performed by: PHYSICIAN ASSISTANT

## 2025-04-22 NOTE — PROGRESS NOTES
Tien Salcedo is a 30 y.o. year old male patient with intermittent bleeding in the mouth for the last month.  Patient reports 4 episodes where he states that he began to taste blood in his mouth and would then spit and had some blood-tinged sputum present.  He states that this only lasted for a few seconds to minutes and resolved spontaneously and again only occurred on 4 occasions.  He states that there was no precipitating factor to the bleeding.  He denies any bleeding when brushing his teeth denies any gingival bleeding in general no bleeding from the tongue no nasal bleeding and denies coughing up blood.  He did see his primary care physician who found nothing worrisome.  He saw his dentist and was noted to have an oral lesion present on the upper anterior buccal mucosal surface near the upper lip frenulum and patient reports that he was told by his dentist that it was a skin tag and this was removed but not sent off for biopsy.  He states that the lesion has not come back and he has not experienced any bleeding since having it removed.  He does suffer from GERD for the last 10 years.  He is back to taking his PPI more recently but had been off of them for some time.  He states that his last GI evaluation was approximately 3 years ago.  He is without other ENT related concerns at this time.         Review of Systems   All other systems reviewed and are negative.        Physical Exam:   General appearance: No acute distress. Normal facies. Symmetric facial movement. No gross lesions of the face are noted.  The external ear structures appear normal. The ear canals patent and the tympanic membranes are intact without evidence of air-fluid levels, retraction, or congenital defects.  Anterior rhinoscopy notes essentially a midline nasal septum. Examination is noted for normal healthy mucosal membranes without any evidence of lesions, polyps, or exudate. The tongue is normally mobile. There are no lesions on the  gingiva, buccal, or oral mucosa. There are no oral cavity masses.  3+ tonsil hypertrophy.  The neck is negative for mass lymphadenopathy. The trachea and parotid are clear. The thyroid bed is grossly unremarkable. The salivary gland structures are grossly unremarkable.  In order to assess the larynx, flexible laryngoscopy was performed based on the patient's history.    After topical anesthesia, a very complete flexible laryngoscopy was performed.  The nasal vaults are unremarkable bilaterally with normal-appearing septum and nasal turbinates without evidence of mass lesion polyp or exudate.  The nasopharynx is clear without evidence of mass or lesion and is noted for only a slight prominence of adenoid tissue.  This examination reveals a normal appearance to the laryngeal structures including the true cords, false cords, epiglottis, base of tongue, and piriform sinus, except as noted.      Assessment/Plan   1.  Oral bleeding    Patient seen in the office today for assessment of his ears nose and throat with concern for bleeding.  On 4 occasions he has had some bleeding in the mouth from an unknown source.  He did have a lesion removed by his dentist and has not experienced any bleeding since that time.  His oral mucosa appears well today.  His nasal examination is unremarkable including nasopharynx and larynx.  I favor observation and seeing me back as needed for ENT concerns.

## 2025-07-10 ENCOUNTER — TELEPHONE (OUTPATIENT)
Dept: PRIMARY CARE | Facility: CLINIC | Age: 30
End: 2025-07-10

## 2025-07-10 NOTE — TELEPHONE ENCOUNTER
Patient called wanting imaging done on his chest neck and head. He seen GI and was told that imaging will need to be done. Please advise he wanted to come in sooner than 8-12 I did tell him to call in daily to get on the schedule for a same day sick appointment but he did not want to do that. He said that he is coughing up blood.

## 2025-07-17 DIAGNOSIS — R04.2 COUGHING UP BLOOD: ICD-10-CM

## 2025-07-17 DIAGNOSIS — K21.9 GASTROESOPHAGEAL REFLUX DISEASE, UNSPECIFIED WHETHER ESOPHAGITIS PRESENT: ICD-10-CM

## 2025-07-17 DIAGNOSIS — K13.79 BLEEDING IN MOUTH: Primary | ICD-10-CM

## 2025-07-17 DIAGNOSIS — R60.0 SWELLING OF SUBLINGUAL GLAND: ICD-10-CM

## 2025-08-05 ENCOUNTER — APPOINTMENT (OUTPATIENT)
Dept: RADIOLOGY | Facility: CLINIC | Age: 30
End: 2025-08-05
Payer: COMMERCIAL

## 2025-08-12 ENCOUNTER — APPOINTMENT (OUTPATIENT)
Dept: PRIMARY CARE | Facility: CLINIC | Age: 30
End: 2025-08-12
Payer: COMMERCIAL

## 2025-08-25 ENCOUNTER — PATIENT MESSAGE (OUTPATIENT)
Dept: PRIMARY CARE | Facility: CLINIC | Age: 30
End: 2025-08-25
Payer: COMMERCIAL

## 2025-08-27 ENCOUNTER — HOSPITAL ENCOUNTER (OUTPATIENT)
Dept: RADIOLOGY | Facility: CLINIC | Age: 30
Discharge: HOME | End: 2025-08-27
Payer: COMMERCIAL

## 2025-08-27 DIAGNOSIS — R04.2 COUGHING UP BLOOD: ICD-10-CM

## 2025-08-27 DIAGNOSIS — K13.79 BLEEDING IN MOUTH: ICD-10-CM

## 2025-08-27 DIAGNOSIS — R60.0 SWELLING OF SUBLINGUAL GLAND: ICD-10-CM

## 2025-08-27 PROCEDURE — 71250 CT THORAX DX C-: CPT

## 2025-08-27 PROCEDURE — 71250 CT THORAX DX C-: CPT | Performed by: RADIOLOGY
